# Patient Record
Sex: FEMALE | Race: WHITE | NOT HISPANIC OR LATINO | Employment: UNEMPLOYED | URBAN - METROPOLITAN AREA
[De-identification: names, ages, dates, MRNs, and addresses within clinical notes are randomized per-mention and may not be internally consistent; named-entity substitution may affect disease eponyms.]

---

## 2017-09-08 ENCOUNTER — TRANSCRIBE ORDERS (OUTPATIENT)
Dept: URGENT CARE | Facility: MEDICAL CENTER | Age: 27
End: 2017-09-08

## 2017-09-08 ENCOUNTER — APPOINTMENT (OUTPATIENT)
Dept: LAB | Facility: MEDICAL CENTER | Age: 27
End: 2017-09-08
Attending: FAMILY MEDICINE

## 2017-09-08 ENCOUNTER — APPOINTMENT (OUTPATIENT)
Dept: URGENT CARE | Facility: MEDICAL CENTER | Age: 27
End: 2017-09-08

## 2017-09-08 DIAGNOSIS — Z02.1 PHYSICAL EXAM, PRE-EMPLOYMENT: Primary | ICD-10-CM

## 2017-09-08 DIAGNOSIS — Z02.1 PHYSICAL EXAM, PRE-EMPLOYMENT: ICD-10-CM

## 2017-09-08 LAB — RUBV IGG SERPL IA-ACNC: 38.6 IU/ML

## 2017-09-08 PROCEDURE — 86762 RUBELLA ANTIBODY: CPT

## 2017-09-08 PROCEDURE — 86480 TB TEST CELL IMMUN MEASURE: CPT

## 2017-09-08 PROCEDURE — 86765 RUBEOLA ANTIBODY: CPT

## 2017-09-08 PROCEDURE — 86735 MUMPS ANTIBODY: CPT

## 2017-09-08 PROCEDURE — 86787 VARICELLA-ZOSTER ANTIBODY: CPT

## 2017-09-08 PROCEDURE — 36415 COLL VENOUS BLD VENIPUNCTURE: CPT

## 2017-09-10 LAB
ANNOTATION COMMENT IMP: NORMAL
GAMMA INTERFERON BACKGROUND BLD IA-ACNC: 0.08 IU/ML
M TB IFN-G BLD-IMP: NEGATIVE
M TB IFN-G CD4+ BCKGRND COR BLD-ACNC: 0 IU/ML
M TB IFN-G CD4+ T-CELLS BLD-ACNC: 0.08 IU/ML
MITOGEN IGNF BLD-ACNC: >10 IU/ML
QUANTIFERON-TB GOLD IN TUBE: NORMAL
SERVICE CMNT-IMP: NORMAL

## 2017-09-12 LAB
MEV IGG SER QL: NORMAL
MUV IGG SER QL: NORMAL
VZV IGG SER IA-ACNC: NORMAL

## 2017-10-12 ENCOUNTER — OFFICE VISIT (OUTPATIENT)
Dept: URGENT CARE | Facility: CLINIC | Age: 27
End: 2017-10-12
Payer: COMMERCIAL

## 2017-10-12 PROCEDURE — G0382 LEV 3 HOSP TYPE B ED VISIT: HCPCS

## 2017-10-13 NOTE — PROGRESS NOTES
Assessment  1  Muscle strain (848 9) (T14 8XXA)    Plan  Muscle strain    · Baclofen 10 MG Oral Tablet; TAKE 1 TABLET 3 TIMES DAILY AS NEEDED FOR  MUSCLE SPASM   · Naproxen 500 MG Oral Tablet; TAKE 1 TABLET EVERY 12 HOURS AS NEEDED    Discussion/Summary  Discussion Summary:   1) take medicine as prescribedapply ice and heat 10-20 minutes at a time at least 4 times a daygentle stretching and massage to area of pain  Medication Side Effects Reviewed: Possible side effects of new medications were reviewed with the patient/guardian today  Understands and agrees with treatment plan: The treatment plan was reviewed with the patient/guardian  The patient/guardian understands and agrees with the treatment plan   Counseling Documentation With Imm: The patient, patient's family was counseled regarding instructions for management,-patient and family education,-importance of compliance with treatment  Chief Complaint  1  Arm Pain  Chief Complaint Free Text Note Form: Patient relates was lifting weights on Sunday and started with pain to right upper arm on Monday  Hurts when she bends her arm  Denies fever  History of Present Illness  HPI: 30yo F p/w R upper arm pain x 4 days s/p lifting weights  Pt feels pain upon adduction and extension of arm  Pt has been taking ibuprofen occasionally and applying ice and heat once a day without relief  Pt denies weakness, numbness, redness, or swelling of arm  Hospital Based Practices Required Assessment:   Abuse And Domestic Violence Screen    Yes, the patient is safe at home -The patient states no one is hurting them  Depression And Suicide Screen  No, the patient has not had thoughts of hurting themself  No, the patient has not felt depressed in the past 7 days  Prefered Language is  english  Primary Language is  english  Review of Systems  Focused-Female:   Constitutional: No fever, no chills, feels well, no tiredness, no recent weight gain or loss     ENT: no ear ache, no loss of hearing, no nosebleeds or nasal discharge, no sore throat or hoarseness  Cardiovascular: no complaints of slow or fast heart rate, no chest pain, no palpitations, no leg claudication or lower extremity edema  Respiratory: no complaints of shortness of breath, no wheezing, no dyspnea on exertion, no orthopnea or PND  Breasts: no complaints of breast pain, breast lump or nipple discharge  Gastrointestinal: no complaints of abdominal pain, no constipation, no nausea or diarrhea, no vomiting, no bloody stools  Genitourinary: no complaints of dysuria, no incontinence, no pelvic pain, no dysmenorrhea, no vaginal discharge or abnormal vaginal bleeding  Musculoskeletal: arthralgias-and-myalgias, but-no joint swelling,-no limb pain,-no joint stiffness-and-no limb swelling  Integumentary: no complaints of skin rash or lesion, no itching or dry skin, no skin wounds  Neurological: no complaints of headache, no confusion, no numbness or tingling, no dizziness or fainting  ROS Reviewed:   ROS reviewed  Past Medical History  1  History of Denial (437 68) (X30 94)  Active Problems And Past Medical History Reviewed: The active problems and past medical history were reviewed and updated today  Family History  Mother    1  No pertinent family history  Father    2  No pertinent family history  Family History Reviewed: The family history was reviewed and updated today  Social History   · Never smoker  Social History Reviewed: The social history was reviewed and is unchanged  Surgical History  1  History of  Section   2  History of Tonsillectomy  Surgical History Reviewed: The surgical history was reviewed and updated today  Current Meds   1  No Reported Medications Recorded  Medication List Reviewed: The medication list was reviewed and updated today  Allergies  1   No Known Drug Allergies    Vitals  Signs   Recorded: [id number] 08:46AM Temperature: 99 F, Tympanic  Heart Rate: 74  Respiration: 18  Systolic: 644, LUE, Sitting  Diastolic: 74, LUE, Sitting  Height: 5 ft 3 75 in  Weight: 282 lb 6 oz  BMI Calculated: 48 85  BSA Calculated: 2 26  O2 Saturation: 98, RA  Pain Scale: 9    Physical Exam    Constitutional   General appearance: No acute distress, well appearing and well nourished  Pulmonary   Respiratory effort: No increased work of breathing or signs of respiratory distress  Auscultation of lungs: Clear to auscultation  Cardiovascular   Palpation of heart: Normal PMI, no thrills  Auscultation of heart: Normal rate and rhythm, normal S1 and S2, without murmurs  Examination of extremities for edema and/or varicosities: Normal     Musculoskeletal   Gait and station: Normal     Digits and nails: Normal without clubbing or cyanosis  Inspection/palpation of joints, bones, and muscles: Abnormal  -TTP over R triceps; pain upon adduction and extension of arm; ROM intact; no deformity, bruising, erythema, warmth to touch  Skin   Skin and subcutaneous tissue: Normal without rashes or lesions  Neurologic   Cranial nerves: Cranial nerves 2-12 intact  Reflexes: 2+ and symmetric  Sensation: No sensory loss  -pt vascularly and neurologically intact     Psychiatric   Orientation to person, place, and time: Normal     Mood and affect: Normal        Signatures   Electronically signed by : RON Marroquin; Oct 12 2017 10:23AM EST                       (Author)    Electronically signed by : CARMEN Knott ; Oct 12 2017  2:55PM EST                       (Co-author)

## 2018-01-10 ENCOUNTER — OFFICE VISIT (OUTPATIENT)
Dept: URGENT CARE | Facility: CLINIC | Age: 28
End: 2018-01-10
Payer: COMMERCIAL

## 2018-01-10 PROCEDURE — 94640 AIRWAY INHALATION TREATMENT: CPT

## 2018-01-10 PROCEDURE — G0382 LEV 3 HOSP TYPE B ED VISIT: HCPCS

## 2018-01-11 NOTE — PROGRESS NOTES
Assessment   1  Exercise-induced bronchospasm (493 81) (J45 990)    Plan   Cough    · Albuterol Sulfate (2 5 MG/3ML) 0 083% Inhalation Nebulization Solution  Exercise-induced bronchospasm    · Proventil  (90 Base) MCG/ACT Inhalation Aerosol Solution; INHALE 1-2    PUFFS EVERY 4-6 HOURS AS NEEDED AND AS DIRECTED    Discussion/Summary   Discussion Summary: You were given an albuterol nebulizer treatment in the office today  her albuterol inhaler as needed for symptoms  Suggest using 0 5 hour before physical activity  up with your family doctor if symptoms persist or worsen  Medication Side Effects Reviewed: Possible side effects of new medications were reviewed with the patient/guardian today  Understands and agrees with treatment plan: The treatment plan was reviewed with the patient/guardian  The patient/guardian understands and agrees with the treatment plan      Chief Complaint   1  Cough  Chief Complaint Free Text Note Form: Cough with Wheezing during activities patient had a episode last night during roller skating and felt like coulg not take a deep breath      History of Present Illness   HPI: Patient presents today with complaints of cough and wheezing that started last evening after doing laps with roller [place] practice  She states she did a history of bronchitis a few years ago  She states she has had wheezing with exercise in the past  She denies any associated fever chills, nausea or vomiting  Hospital Based Practices Required Assessment:      Abuse And Domestic Violence Screen       Yes, the patient is safe at home  -- The patient states no one is hurting them  Depression And Suicide Screen  No, the patient has not had thoughts of hurting themself  No, the patient has not felt depressed in the past 7 days  Prefered Language is  [place]  Primary Language is  English        Review of Systems   Focused-Female:      Constitutional: No fever, no chills, feels well, no tiredness, no recent weight gain or loss  ENT: no ear ache, no loss of hearing, no nosebleeds or nasal discharge, no sore throat or hoarseness  Cardiovascular: no complaints of slow or fast heart rate, no chest pain, no palpitations, no leg claudication or lower extremity edema  Respiratory: cough-- and-- wheezing  Musculoskeletal: no complaints of arthralgia, no myalgia, no joint swelling or stiffness, no limb pain or swelling  Integumentary: no rashes  Active Problems   1  Muscle strain (848 9) (T14 8XXA)    Past Medical History   1  History of Denial (799 29) (R45 89)  Active Problems And Past Medical History Reviewed: The active problems and past medical history were reviewed and updated today  Family History   Mother    1  No pertinent family history  Father    2  No pertinent family history  Family History Reviewed: The family history was reviewed and updated today  Social History    · Never smoker  Social History Reviewed: The social history was reviewed and updated today  Surgical History   1  History of  Section   2  History of Tonsillectomy  Surgical History Reviewed: The surgical history was reviewed and updated today  Current Meds    1  Baclofen 10 MG Oral Tablet; TAKE 1 TABLET 3 TIMES DAILY AS NEEDED FOR MUSCLE     SPASM; Therapy: [id number] to ( 30)  Requested for: 46MJC2976; Last     Rx:2017 Ordered   2  Naproxen 500 MG Oral Tablet; TAKE 1 TABLET EVERY 12 HOURS AS NEEDED; Therapy: [id number] to ( 30)  Requested for: 51WDT0075; Last     Rx:2017 Ordered  Medication List Reviewed: The medication list was reviewed and updated today  Allergies   1   No Known Drug Allergies    Vitals   Signs   Recorded: 95RNG7045 09:06AM   Temperature: 99 2 F  Heart Rate: 80  Systolic: 749  Diastolic: 79  Height: 5 ft 4 in  Weight: 279 lb   BMI Calculated: 47 89  BSA Calculated: 2 25  O2 Saturation: 97  Pain Scale: 2    Physical Exam        Constitutional      General appearance: No acute distress, well appearing and well nourished  Eyes      Conjunctiva and lids: No swelling, erythema or discharge  Pupils and irises: Equal, round and reactive to light  Ears, Nose, Mouth, and Throat      External inspection of ears and nose: Normal        Otoscopic examination: Tympanic membranes translucent with normal light reflex  Canals patent without erythema  Nasal mucosa, septum, and turbinates: Normal without edema or erythema  Oropharynx: Normal with no erythema, edema, exudate or lesions  Pulmonary      Respiratory effort: No increased work of breathing or signs of respiratory distress  Auscultation of lungs: Abnormal  -- Decreased breath sounds with end expiratory wheeze  No costal retractions  Procedure        Treatment #1 included Albuterol Sulfate 2 5 mg  Air exchange was improved        Signatures    Electronically signed by : Christie Barker DO; Roger 10 2018  9:39AM EST                       (Author)

## 2018-01-23 VITALS
BODY MASS INDEX: 47.63 KG/M2 | HEART RATE: 80 BPM | WEIGHT: 279 LBS | SYSTOLIC BLOOD PRESSURE: 119 MMHG | OXYGEN SATURATION: 97 % | TEMPERATURE: 99.2 F | DIASTOLIC BLOOD PRESSURE: 79 MMHG | HEIGHT: 64 IN

## 2018-03-01 ENCOUNTER — EVALUATION (OUTPATIENT)
Dept: PHYSICAL THERAPY | Facility: CLINIC | Age: 28
End: 2018-03-01
Payer: COMMERCIAL

## 2018-03-01 DIAGNOSIS — M25.472 LEFT ANKLE SWELLING: ICD-10-CM

## 2018-03-01 DIAGNOSIS — M25.572 ACUTE LEFT ANKLE PAIN: Primary | ICD-10-CM

## 2018-03-01 PROCEDURE — 97162 PT EVAL MOD COMPLEX 30 MIN: CPT | Performed by: PHYSICAL THERAPIST

## 2018-03-01 PROCEDURE — G8978 MOBILITY CURRENT STATUS: HCPCS | Performed by: PHYSICAL THERAPIST

## 2018-03-01 PROCEDURE — G8979 MOBILITY GOAL STATUS: HCPCS | Performed by: PHYSICAL THERAPIST

## 2018-03-01 PROCEDURE — 97014 ELECTRIC STIMULATION THERAPY: CPT | Performed by: PHYSICAL THERAPIST

## 2018-03-01 PROCEDURE — 97110 THERAPEUTIC EXERCISES: CPT | Performed by: PHYSICAL THERAPIST

## 2018-03-01 RX ORDER — ASPIRIN 325 MG
325 TABLET ORAL DAILY
COMMUNITY

## 2018-03-01 NOTE — PROGRESS NOTES
PT Evaluation     Today's date: 3/1/2018  Patient name: Jules Alvarez  : 1990  MRN: [id number]  Referring provider: Nick León MD  Dx:   Encounter Diagnosis     ICD-10-CM    1  Acute left ankle pain M25 572    2  Left ankle swelling M25 472                   Assessment  Impairments: abnormal gait, abnormal muscle firing, abnormal muscle tone, abnormal or restricted ROM, impaired balance, impaired physical strength, lacks appropriate home exercise program and pain with function  Other impairment: Swelling  Functional limitations: Difficulty walking, Difficulty with stairs  Assessment details: Patient is a 33 y o  female presenting to PT on 3/1/2018 for Acute left ankle pain  (primary encounter diagnosis) andLeft ankle swelling secondary to bimalleolar fracture sustained on 18 with ORIF on 2/15  Maria Isabel Rodriguez is demonstrating objective deficits with strength, ROM, and mobility associated with diagnosis and is experiencing significant subsequent functional deficits  Pt  Would benefit from skilled physical therapy to address impairments listed above and promote maximum level of function       Barriers to therapy: Transportatin, high copay  Understanding of Dx/Px/POC: good   Prognosis: good    Goals    Short Term Goals:  - Decrease pain by 50% in 3 weeks  - Increase ROM by 50% in 4 weeks  - Increase strength by 50% in 4 weeks    Long Term Goals:  - Independent with comprehensive home exercise program at discharge  - Increase Functional Status Measure to: 70 (FOTO)  - Increase strength equal to contralateral side at discharge  - Increase ROM to [facility] at discharge      Plan  Planned modality interventions: cryotherapy and unattended electrical stimulation  Planned therapy interventions: abdominal trunk stabilization, balance, balance/weight bearing training, body mechanics training, home exercise program, flexibility, functional ROM exercises, therapeutic exercise, therapeutic activities, stretching, strengthening, manual therapy, joint mobilization, neuromuscular re-education and postural training  Frequency: 2x week  Duration in weeks: 8  Treatment plan discussed with: patient        Subjective Evaluation    History of Present Illness  Date of onset: 2018  Date of surgery: 2/15/2018  Mechanism of injury: Pt  Presents to PT with cc of L ankle pain and swelling secondary to sustaining closed bimalleolar fracture on 18 while playing roller derby  She had fracture reduced at bedside and was given a splint  She received ORIF on 2/15/18 and was d/c home that day  She was in cast for two weeks and transitioned to removable boot  She presents with script for PT with NWB precautions  Pt  Denies any fever/chills or ss of infection  She does note tightness in calf and foot mm  She is f/u with surgeon next week  Pain  Current pain ratin  At best pain ratin  At worst pain ratin  Location: L ankle  Quality: pressure, sharp, burning and discomfort  Relieving factors: relaxation, rest, support and ice  Aggravating factors: standing    Social Support  Steps to enter house: yes  Stairs in house: yes   Lives with: young children and spouse    Employment status: not working  Patient Goals  Patient goals for therapy: increased motion, decreased edema, decreased pain, increased strength, return to sport/leisure activities, return to work and independence with ADLs/IADLs          Objective     Observations   Left Ankle/Foot   Positive for edema  Additional Observation Details  Medial and lateral incisions intact with no drainage or ss of infection  Palpation   Left   Tenderness of the anterior tibialis, lateral gastrocnemius and medial gastrocnemius       Neurological Testing     Sensation     Ankle/Foot   Left Ankle/Foot   Intact: light touch    Right Ankle/Foot   Intact: light touch     Reflexes   Left   Patellar (L4): normal (2+)    Right   Patellar (L4): normal (2+)    Active Range of Motion   Left Ankle/Foot   Dorsiflexion (ke): -9 degrees   Plantar flexion: 29 degrees   Inversion: 2 degrees   Eversion: 2 degrees     Right Ankle/Foot   Dorsiflexion (ke): 15 degrees   Plantar flexion: WFL  Inversion: WFL  Eversion: WFL    Passive Range of Motion   Left Ankle/Foot    Dorsiflexion (ke): -3 degrees   Plantar flexion: 40 degrees   Inversion: 4 degrees   Eversion: 6 degrees     Strength/Myotome Testing     Right Ankle/Foot   Normal strength    Swelling   Left Ankle/Foot   Metatarsal heads: 29 9 cm  Figure 8: 63 8 cm    Right Ankle/Foot   Metatarsal heads: 26 cm  Figure 8: 57 cm    Ambulation   Weight-Bearing Status   Weight-Bearing Status (Left): non-weight-bearing   Assistive device used: crutches          Precautions: s/p L ankle ORIF 2/15/18, NWB (6 weeks)    Daily Treatment Diary     Manual  3/1            Retrograde Massage -                                                                    Exercise Diary  3/1            Ankle Pumps 30x            Toe Crunches 30x            ABCs 2x            Inv/Ev 30                                                                                                                                                                                                                                Modalities  3/1            E-stim with CP 15 min

## 2018-03-01 NOTE — LETTER
2018    Emy Benson MD  [address] [facility] [id number]  [id number]    Patient: Ricardo Banda   YOB: 1990   Date of Visit: 3/1/2018     Encounter Diagnosis     ICD-10-CM    1  Acute left ankle pain M25 572    2  Left ankle swelling M25 472        Dear Dr Bertha Dunlap:    Please review the attached Plan of Care from [facility] recent visit  Please verify that you agree therapy should continue by signing the attached document and sending it back to our office  If you have any questions or concerns, please don't hesitate to call  Sincerely,    Saleem Sanabria, PT      Referring Provider:      I certify that I have read the below Plan of Care and certify the need for these services furnished under this plan of treatment while under my care  Emy Benson MD  [address] [facility] [id number] [facility]  VIA Facsimile: [phone]          PT Evaluation     Today's date: 3/1/2018  Patient name: Ricardo Banda  : 1990  MRN: [id number]  Referring provider: Nathan Hoffmann MD  Dx:   Encounter Diagnosis     ICD-10-CM    1  Acute left ankle pain M25 572    2  Left ankle swelling M25 472                   Assessment  Impairments: abnormal gait, abnormal muscle firing, abnormal muscle tone, abnormal or restricted ROM, impaired balance, impaired physical strength, lacks appropriate home exercise program and pain with function  Other impairment: Swelling  Functional limitations: Difficulty walking, Difficulty with stairs  Assessment details: Patient is a 33 y o  female presenting to PT on 3/1/2018 for Acute left ankle pain  (primary encounter diagnosis) andLeft ankle swelling secondary to bimalleolar fracture sustained on 18 with ORIF on 2/15  Peg Cellar is demonstrating objective deficits with strength, ROM, and mobility associated with diagnosis and is experiencing significant subsequent functional deficits   Pt  Would benefit from skilled physical therapy to address impairments listed above and promote maximum level of function  Barriers to therapy: Transportatin, high copay  Understanding of Dx/Px/POC: good   Prognosis: good    Goals    Short Term Goals:  - Decrease pain by 50% in 3 weeks  - Increase ROM by 50% in 4 weeks  - Increase strength by 50% in 4 weeks    Long Term Goals:  - Independent with comprehensive home exercise program at discharge  - Increase Functional Status Measure to: 70 (FOTO)  - Increase strength equal to contralateral side at discharge  - Increase ROM to [facility] at discharge      Plan  Planned modality interventions: cryotherapy and unattended electrical stimulation  Planned therapy interventions: abdominal trunk stabilization, balance, balance/weight bearing training, body mechanics training, home exercise program, flexibility, functional ROM exercises, therapeutic exercise, therapeutic activities, stretching, strengthening, manual therapy, joint mobilization, neuromuscular re-education and postural training  Frequency: 2x week  Duration in weeks: 8  Treatment plan discussed with: patient        Subjective Evaluation    History of Present Illness  Date of onset: 2018  Date of surgery: 2/15/2018  Mechanism of injury: Pt  Presents to PT with cc of L ankle pain and swelling secondary to sustaining closed bimalleolar fracture on 18 while playing roller derby  She had fracture reduced at bedside and was given a splint  She received ORIF on 2/15/18 and was d/c home that day  She was in cast for two weeks and transitioned to removable boot  She presents with script for PT with NWB precautions  Pt  Denies any fever/chills or ss of infection  She does note tightness in calf and foot mm  She is f/u with surgeon next week     Pain  Current pain ratin  At best pain ratin  At worst pain ratin  Location: L ankle  Quality: pressure, sharp, burning and discomfort  Relieving factors: relaxation, rest, support and ice  Aggravating factors: standing    Social Support  Steps to enter house: yes  Stairs in house: yes   Lives with: young children and spouse    Employment status: not working  Patient Goals  Patient goals for therapy: increased motion, decreased edema, decreased pain, increased strength, return to sport/leisure activities, return to work and independence with ADLs/IADLs          Objective     Observations   Left Ankle/Foot   Positive for edema  Additional Observation Details  Medial and lateral incisions intact with no drainage or ss of infection  Palpation   Left   Tenderness of the anterior tibialis, lateral gastrocnemius and medial gastrocnemius       Neurological Testing     Sensation     Ankle/Foot   Left Ankle/Foot   Intact: light touch    Right Ankle/Foot   Intact: light touch     Reflexes   Left   Patellar (L4): normal (2+)    Right   Patellar (L4): normal (2+)    Active Range of Motion   Left Ankle/Foot   Dorsiflexion (ke): -9 degrees   Plantar flexion: 29 degrees   Inversion: 2 degrees   Eversion: 2 degrees     Right Ankle/Foot   Dorsiflexion (ke): 15 degrees   Plantar flexion: WFL  Inversion: WFL  Eversion: WFL    Passive Range of Motion   Left Ankle/Foot    Dorsiflexion (ke): -3 degrees   Plantar flexion: 40 degrees   Inversion: 4 degrees   Eversion: 6 degrees     Strength/Myotome Testing     Right Ankle/Foot   Normal strength    Swelling   Left Ankle/Foot   Metatarsal heads: 29 9 cm  Figure 8: 63 8 cm    Right Ankle/Foot   Metatarsal heads: 26 cm  Figure 8: 57 cm    Ambulation   Weight-Bearing Status   Weight-Bearing Status (Left): non-weight-bearing   Assistive device used: crutches          Precautions: s/p L ankle ORIF 2/15/18, NWB (6 weeks)    Daily Treatment Diary     Manual  3/1            Retrograde Massage -                                                                    Exercise Diary  3/1            Ankle Pumps 30x            Toe Crunches 30x            ABCs 2x            Inv/Ev 30 Modalities  3/1            E-stim with CP 15 min

## 2018-03-02 ENCOUNTER — TRANSCRIBE ORDERS (OUTPATIENT)
Dept: PHYSICAL THERAPY | Facility: CLINIC | Age: 28
End: 2018-03-02

## 2018-03-02 DIAGNOSIS — M25.472 LEFT ANKLE SWELLING: ICD-10-CM

## 2018-03-02 DIAGNOSIS — M25.572 LEFT ANKLE PAIN, UNSPECIFIED CHRONICITY: Primary | ICD-10-CM

## 2018-03-06 ENCOUNTER — OFFICE VISIT (OUTPATIENT)
Dept: PHYSICAL THERAPY | Facility: CLINIC | Age: 28
End: 2018-03-06
Payer: COMMERCIAL

## 2018-03-06 DIAGNOSIS — M25.572 ACUTE LEFT ANKLE PAIN: Primary | ICD-10-CM

## 2018-03-06 DIAGNOSIS — M25.472 LEFT ANKLE SWELLING: ICD-10-CM

## 2018-03-06 PROCEDURE — 97140 MANUAL THERAPY 1/> REGIONS: CPT | Performed by: PHYSICAL THERAPIST

## 2018-03-06 PROCEDURE — 97110 THERAPEUTIC EXERCISES: CPT | Performed by: PHYSICAL THERAPIST

## 2018-03-06 PROCEDURE — 97014 ELECTRIC STIMULATION THERAPY: CPT | Performed by: PHYSICAL THERAPIST

## 2018-03-06 NOTE — PROGRESS NOTES
Daily Note     Today's date: 3/6/2018  Patient name: Zack Ponce  : 1990  MRN: [id number]  Referring provider: Theo Quevedo MD  Dx:   Encounter Diagnosis     ICD-10-CM    1  Acute left ankle pain M25 572    2  Left ankle swelling M25 472                   Subjective: Pt  Notes some soreness following last session but it only lasted a few minutes  She notes catching foot on curb and fell on buttocks in controlled fashion  She notes being startled but denies any trauma/pain  Pt  Seeing MD today for staple removal/f/u  Objective: See treatment diary below    Precautions: s/p L ankle ORIF 2/15/18, NWB (6 weeks)     Daily Treatment Diary      Manual  3/1  3/6                   Retrograde Massage -  5 min                   Calf STM/MFR -  5 min                                                                                                 Exercise Diary  3/1  3/6                   Ankle Pumps 30x  30x                   Toe Crunches 30x  30x                   ABCs 2x  2x                   Inv/Ev 30  30x                   SLR - 30x                   S/L Abduction - 20x                   LAQ - 30x                                                                                                                                                                                                                                                                                                                                                 Modalities  3/1  3/6                   E-stim with CP 15 min  15 min                                                                        Assessment: Pt  Tolerating treatment well  She was educated on importance of AROM and proximal strengthening to promote healing and reduce fatty infiltrate  Pt  Does not show any symptoms/issues following reporting tripping on curb  Plan: Continue per plan of care  Progress treament per protocol

## 2018-03-08 ENCOUNTER — OFFICE VISIT (OUTPATIENT)
Dept: PHYSICAL THERAPY | Facility: CLINIC | Age: 28
End: 2018-03-08
Payer: COMMERCIAL

## 2018-03-08 DIAGNOSIS — M25.472 LEFT ANKLE SWELLING: ICD-10-CM

## 2018-03-08 DIAGNOSIS — M25.572 ACUTE LEFT ANKLE PAIN: Primary | ICD-10-CM

## 2018-03-08 PROCEDURE — 97140 MANUAL THERAPY 1/> REGIONS: CPT | Performed by: PHYSICAL THERAPIST

## 2018-03-08 PROCEDURE — 97110 THERAPEUTIC EXERCISES: CPT | Performed by: PHYSICAL THERAPIST

## 2018-03-08 PROCEDURE — 97014 ELECTRIC STIMULATION THERAPY: CPT | Performed by: PHYSICAL THERAPIST

## 2018-03-12 ENCOUNTER — OFFICE VISIT (OUTPATIENT)
Dept: PHYSICAL THERAPY | Facility: CLINIC | Age: 28
End: 2018-03-12
Payer: COMMERCIAL

## 2018-03-12 DIAGNOSIS — M25.472 LEFT ANKLE SWELLING: ICD-10-CM

## 2018-03-12 DIAGNOSIS — M25.572 ACUTE LEFT ANKLE PAIN: Primary | ICD-10-CM

## 2018-03-12 PROCEDURE — 97140 MANUAL THERAPY 1/> REGIONS: CPT | Performed by: PHYSICAL THERAPIST

## 2018-03-12 PROCEDURE — 97110 THERAPEUTIC EXERCISES: CPT | Performed by: PHYSICAL THERAPIST

## 2018-03-12 PROCEDURE — 97014 ELECTRIC STIMULATION THERAPY: CPT | Performed by: PHYSICAL THERAPIST

## 2018-03-12 NOTE — PROGRESS NOTES
Daily Note     Today's date: 3/12/2018  Patient name: Celso Angela  : 1990  MRN: [id number]  Referring provider: Julien Navarro MD  Dx:   Encounter Diagnosis     ICD-10-CM    1  Acute left ankle pain M25 572    2  Left ankle swelling M25 472                   Subjective: Pt  States use of crutches is getting easier  She is planning to do work part time starting next week  Objective: See treatment diary below    Precautions: s/p L ankle ORIF 2/15/18, NWB (6 weeks)     Daily Treatment Diary      Manual  3/1  3/6  3/8  3/12               Retrograde Massage -  5 min  5 min  -               Calf STM/MFR -  5 min  5 min  IASTM 10 min                                                                                             Exercise Diary  3/1  3/6  3/8  3/12               Ankle Pumps 30x  30x  30x  30x               Toe Crunches 30x  30x  30x  30x               ABCs 2x  2x  2x  2x               Inv/Ev 30  30x  30x  30x               SLR - 30x  30x  30x               S/L Abduction - 20x  30x  30x               LAQ - 30x  30x  30x               Calf Stretch - - 4x30"  4x30"               Reverse Crunch - - -  10x                                                                                                                                                                                                                                                                                             Modalities  3/1  3/6  3/8  3/12               E-stim with CP 15 min  15 min  15 min  15 min                                                                    Assessment: Celso Angela was progressed with PT interventions, focusing on appropriate technique and intensity  Pt  Required mod cuing from therapist to complete  Pt  Would benefit from continued physical therapy to promote improved activity tolerance and function  Plan: Continue per plan of care

## 2018-03-15 ENCOUNTER — OFFICE VISIT (OUTPATIENT)
Dept: PHYSICAL THERAPY | Facility: CLINIC | Age: 28
End: 2018-03-15
Payer: COMMERCIAL

## 2018-03-15 DIAGNOSIS — M25.472 LEFT ANKLE SWELLING: ICD-10-CM

## 2018-03-15 DIAGNOSIS — M25.572 ACUTE LEFT ANKLE PAIN: Primary | ICD-10-CM

## 2018-03-15 PROCEDURE — 97110 THERAPEUTIC EXERCISES: CPT | Performed by: PHYSICAL THERAPIST

## 2018-03-15 PROCEDURE — 97014 ELECTRIC STIMULATION THERAPY: CPT | Performed by: PHYSICAL THERAPIST

## 2018-03-15 PROCEDURE — 97140 MANUAL THERAPY 1/> REGIONS: CPT | Performed by: PHYSICAL THERAPIST

## 2018-03-15 NOTE — PROGRESS NOTES
Daily Note     Today's date: 3/15/2018  Patient name: Natalie Borjas  : 1990  MRN: [id number]  Referring provider: Matthew Casiano MD  Dx:   Encounter Diagnosis     ICD-10-CM    1  Acute left ankle pain M25 572    2  Left ankle swelling M25 472                   Subjective: Pt  Notes knee seems to be loosening up lately  She continues to have ankle soreness and stiffness  Objective: See treatment diary below  Precautions: s/p L ankle ORIF 2/15/18, NWB (6 weeks)     Daily Treatment Diary      Manual  3/1  3/6  3/8  3/12  3/15             Retrograde Massage -  5 min  5 min  -               Calf STM/MFR -  5 min  5 min  IASTM 10 min  IASTM10 min                                                                                           Exercise Diary  3/1  3/6  3/8  3/12  3/15             Ankle Pumps 30x  30x  30x  30x  30x             Toe Crunches 30x  30x  30x  30x  30x             ABCs 2x  2x  2x  2x  2x             Inv/Ev 30  30x  30x  30x  30x             SLR - 30x  30x  30x  30x             S/L Abduction - 20x  30x  30x  30x             LAQ - 30x  30x  30x  30x             Calf Stretch - - 4x30"  4x30"  4x30"             Reverse Crunch - - -  10x  20x              S/L abduction - - - - 30x              hip Extensions - - - - 30x                                                                                                                                                                                                                                           Modalities  3/1  3/6  3/8  3/12  3/15             E-stim with CP 15 min  15 min  15 min  15 min  15 min                                                                    Assessment: Tolerated treatment well  Patient demonstrated fatigue post treatment, exhibited good technique with therapeutic exercises and would benefit from continued PT      Plan: Continue per plan of care  Progress treatment as tolerated

## 2018-03-19 ENCOUNTER — OFFICE VISIT (OUTPATIENT)
Dept: PHYSICAL THERAPY | Facility: CLINIC | Age: 28
End: 2018-03-19
Payer: COMMERCIAL

## 2018-03-19 DIAGNOSIS — M25.472 LEFT ANKLE SWELLING: ICD-10-CM

## 2018-03-19 DIAGNOSIS — M25.572 ACUTE LEFT ANKLE PAIN: Primary | ICD-10-CM

## 2018-03-19 PROCEDURE — 97110 THERAPEUTIC EXERCISES: CPT

## 2018-03-19 PROCEDURE — 97014 ELECTRIC STIMULATION THERAPY: CPT

## 2018-03-19 PROCEDURE — 97140 MANUAL THERAPY 1/> REGIONS: CPT

## 2018-03-19 NOTE — PROGRESS NOTES
Daily Note     Today's date: 3/19/2018  Patient name: Danial Dunn  : 1990  MRN: [id number]  Referring provider: Ene Ron MD  Dx:   Encounter Diagnosis     ICD-10-CM    1  Acute left ankle pain M25 572    2  Left ankle swelling M25 472                   Subjective: Patient reports her ankle is feeling really good itself  Stated she is tired of using crutches  Objective: See treatment diary below      Assessment: Progressed through TE w/ no complaints  Very sensitive @ medial calf as noted during IASTM  Tolerated treatment well  Patient exhibited good technique with therapeutic exercises and would benefit from continued PT      Plan: Continue per plan of care  Cont  To progress per protocol      Precautions: s/p L ankle ORIF 2/15/18, NWB (6 weeks)     Daily Treatment Diary      Manual  3/1  3/6  3/8  3/12  3/15  3/19           Retrograde Massage -  5 min  5 min  -               Calf STM/MFR -  5 min  5 min  IASTM 10 min  IASTM10 min  ISTM 10min                                                                                         Exercise Diary  3/1  3/6  3/8  3/12  3/15  3/19           Ankle Pumps 30x  30x  30x  30x  30x  30x           Toe Crunches 30x  30x  30x  30x  30x 30x           ABCs 2x  2x  2x  2x  2x  2x           Inv/Ev 30  30x  30x  30x  30x  30x           SLR - 30x  30x  30x  30x  30x           S/L Abduction - 20x  30x  30x  30x  30x           LAQ - 30x  30x  30x  30x  30x           Calf Stretch - - 4x30"  4x30"  4x30"  4x30"           Reverse Crunch - - -  10x  20x              S/L abduction - - - - 30x  30x            hip Extensions - - - - 30x  30x                                                                                                                                                                                                                                         Modalities  3/1  3/6  3/8  3/12  3/15  3/19           E-stim with CP 15 min  15 min  15 min  15 min  15 min  15 min

## 2018-03-22 ENCOUNTER — OFFICE VISIT (OUTPATIENT)
Dept: PHYSICAL THERAPY | Facility: CLINIC | Age: 28
End: 2018-03-22
Payer: COMMERCIAL

## 2018-03-22 DIAGNOSIS — M25.572 ACUTE LEFT ANKLE PAIN: Primary | ICD-10-CM

## 2018-03-22 DIAGNOSIS — M25.472 LEFT ANKLE SWELLING: ICD-10-CM

## 2018-03-22 PROCEDURE — 97140 MANUAL THERAPY 1/> REGIONS: CPT | Performed by: PHYSICAL THERAPIST

## 2018-03-22 PROCEDURE — 97014 ELECTRIC STIMULATION THERAPY: CPT | Performed by: PHYSICAL THERAPIST

## 2018-03-22 PROCEDURE — 97110 THERAPEUTIC EXERCISES: CPT | Performed by: PHYSICAL THERAPIST

## 2018-03-22 NOTE — PROGRESS NOTES
Daily Note     Today's date: 3/22/2018  Patient name: Darrius Theodore  : 1990  MRN: [id number]  Referring provider: Alecia Barton MD  Dx:   Encounter Diagnosis     ICD-10-CM    1  Acute left ankle pain M25 572    2  Left ankle swelling M25 472                   Subjective: Pt  Remains frustrated with crutches  States she is seeing MD tomorrow due to fear of infection  She denies fever/chills  Objective: See treatment diary below      Assessment: Pt  ttp is reducing  (-) Kuldeep's at calf, no discoloration  Pt  Has palpable trigger points remaining  Pt  Is seeing MD tomorrow due to fear of infection at incision  Pt  Does not show any erythema or discharge but she reports itching yesterday and some bloody discharge  Pt  Educated on ways of reducing pressure against incision in walking boot to reduce agitation  Plan: Continue per plan of care  Cont  To progress per protocol      Precautions: s/p L ankle ORIF 2/15/18, NWB (6 weeks)     Daily Treatment Diary      Manual  3/1  3/6  3/8  3/12  3/15  3/19  3/22         Retrograde Massage -  5 min  5 min  -               Calf STM/MFR -  5 min  5 min  IASTM 10 min  IASTM10 min  ISTM 10min  IASTM 10 min                                                                                       Exercise Diary  3/1  3/6  3/8  3/12  3/15  3/19  3/22         Ankle Pumps 30x  30x  30x  30x  30x  30x  30x         Toe Crunches 30x  30x  30x  30x  30x 30x  30x         ABCs 2x  2x  2x  2x  2x  2x  2x         Inv/Ev 30  30x  30x  30x  30x  30x  30x         SLR - 30x  30x  30x  30x  30x  30x         S/L Abduction - 20x  30x  30x  30x  30x  30x         LAQ - 30x  30x  30x  30x  30x  30x         Calf Stretch - - 4x30"  4x30"  4x30"  4x30"  4x30"         Reverse Crunch - - -  10x  20x    30x          S/L abduction - - - - 30x  30x  30x          hip Extensions - - - - 30x  30x  30x          HS Curls - - - - - -  30x          nerve Glides - - - - - -  10x                                                                                                                                                                                       Modalities  3/1  3/6  3/8  3/12  3/15  3/19  3/22         E-stim with CP 15 min  15 min  15 min  15 min  15 min  15 min  15 min

## 2018-03-26 ENCOUNTER — APPOINTMENT (OUTPATIENT)
Dept: PHYSICAL THERAPY | Facility: CLINIC | Age: 28
End: 2018-03-26
Payer: COMMERCIAL

## 2018-03-29 ENCOUNTER — OFFICE VISIT (OUTPATIENT)
Dept: PHYSICAL THERAPY | Facility: CLINIC | Age: 28
End: 2018-03-29
Payer: COMMERCIAL

## 2018-03-29 DIAGNOSIS — M25.572 ACUTE LEFT ANKLE PAIN: Primary | ICD-10-CM

## 2018-03-29 DIAGNOSIS — M25.472 LEFT ANKLE SWELLING: ICD-10-CM

## 2018-03-29 PROCEDURE — 97014 ELECTRIC STIMULATION THERAPY: CPT | Performed by: PHYSICAL THERAPIST

## 2018-03-29 PROCEDURE — 97110 THERAPEUTIC EXERCISES: CPT | Performed by: PHYSICAL THERAPIST

## 2018-03-29 PROCEDURE — 97140 MANUAL THERAPY 1/> REGIONS: CPT | Performed by: PHYSICAL THERAPIST

## 2018-03-29 NOTE — PROGRESS NOTES
Daily Note     Today's date: 3/29/2018  Patient name: Desire Meredith  : 1990  MRN: [id number]  Referring provider: Aruna Keita MD  Dx:   Encounter Diagnosis     ICD-10-CM    1  Acute left ankle pain M25 572    2  Left ankle swelling M25 472                   Subjective: Pt  Visited surgeon and has been cleared for PWB through ankle  He has her starting new round of antibiotics but feels infection is clearing up  Pt  Notes compliance to HEP  Objective: See treatment diary below  Precautions: S/P L Ankle ORIF     Daily Treatment Diary     Manual  3/29            Gr 1-2 Ankle Mobz 5 min            PROM into restrictions 5 min            MRE in [place] -            IASTM -                             Exercise Diary  3/29            Stationary Bike L1, 5 min            Gait Training  Level Surf  TB 4 way [place], 20x            Seated HR 20x            Baps 3 min            LAQ 30x            Leg Curl 30x            SLR 4 way 30x                                                                                                                                                                            Modalities  3/29            HV with CP 15 min                                                Assessment: Pt  Began PWB ambulation with single crutch  She tolerated well but is limited by apprehension  Pt  Progressed per protocol as she is 6 weeks s/p ORIF  Pt  Would benefit from continued PT to maximize function and increase activity tolerance  Plan: Continue per plan of care  Progress treament per protocol

## 2018-04-02 ENCOUNTER — OFFICE VISIT (OUTPATIENT)
Dept: PHYSICAL THERAPY | Facility: CLINIC | Age: 28
End: 2018-04-02
Payer: COMMERCIAL

## 2018-04-02 DIAGNOSIS — M25.572 ACUTE LEFT ANKLE PAIN: Primary | ICD-10-CM

## 2018-04-02 DIAGNOSIS — M25.472 LEFT ANKLE SWELLING: ICD-10-CM

## 2018-04-02 PROCEDURE — 97014 ELECTRIC STIMULATION THERAPY: CPT

## 2018-04-02 PROCEDURE — 97110 THERAPEUTIC EXERCISES: CPT

## 2018-04-02 PROCEDURE — 97112 NEUROMUSCULAR REEDUCATION: CPT

## 2018-04-05 ENCOUNTER — OFFICE VISIT (OUTPATIENT)
Dept: PHYSICAL THERAPY | Facility: CLINIC | Age: 28
End: 2018-04-05
Payer: COMMERCIAL

## 2018-04-05 DIAGNOSIS — M25.572 ACUTE LEFT ANKLE PAIN: Primary | ICD-10-CM

## 2018-04-05 DIAGNOSIS — M25.472 LEFT ANKLE SWELLING: ICD-10-CM

## 2018-04-05 PROCEDURE — 97112 NEUROMUSCULAR REEDUCATION: CPT | Performed by: PHYSICAL THERAPIST

## 2018-04-05 PROCEDURE — 97110 THERAPEUTIC EXERCISES: CPT | Performed by: PHYSICAL THERAPIST

## 2018-04-05 PROCEDURE — 97014 ELECTRIC STIMULATION THERAPY: CPT | Performed by: PHYSICAL THERAPIST

## 2018-04-05 NOTE — PROGRESS NOTES
Daily Note     Today's date: 2018  Patient name: Jorge Luis Cornelius  : 1990  MRN: [id number]  Referring provider: Danae Thacker MD  Dx:   Encounter Diagnosis     ICD-10-CM    1  Acute left ankle pain M25 572    2  Left ankle swelling M25 472                   Subjective: Pt  Denies soreness following last session  Objective: See treatment diary below      Assessment: Pt  Demonstrating good technique with stair ambulation today  She was trained with using shoe and brace for PWB  Pt  Progressed with OKC and CKC strengthening per protocol  PROM restricted in all motions  Plan: Continue per plan of care       Precautions: S/P L Ankle ORIF      Daily Treatment Diary      Manual  3/29  4/2  4/5                 Gr 1-2 Ankle Mobz 5 min  np  5 min                 PROM into restrictions 5 min  5 min  5 min                 MRE in [place] -    -                 IASTM -    5 min                                               Exercise Diary  3/29  4/2  4/5                 Stationary Bike L1, 5 min  10 min  10 min                 Gait Training  Level Surf   weight shift/ stair training  with shoe                 TB 4 way [place], 20x  [place] x30 [place], 30x                 Seated HR 20x  20x  30x                 Baps 3 min  30x  ea  30x ea                 LAQ 30x  #1 5 x30  3#, 30x                 Leg Curl 30x  #1 5 x30  3#, 30x                 SLR 4 way 30x  #1 5 x30  3#, 30x                                                                                                                                                                                                                                                                                                                       Modalities  3/29  4/2  4/5                 HV with CP 15 min  15 min  15 min

## 2018-04-09 ENCOUNTER — OFFICE VISIT (OUTPATIENT)
Dept: PHYSICAL THERAPY | Facility: CLINIC | Age: 28
End: 2018-04-09
Payer: COMMERCIAL

## 2018-04-09 ENCOUNTER — APPOINTMENT (OUTPATIENT)
Dept: PHYSICAL THERAPY | Facility: CLINIC | Age: 28
End: 2018-04-09
Payer: COMMERCIAL

## 2018-04-09 DIAGNOSIS — M25.572 ACUTE LEFT ANKLE PAIN: Primary | ICD-10-CM

## 2018-04-09 DIAGNOSIS — M25.472 LEFT ANKLE SWELLING: ICD-10-CM

## 2018-04-09 PROCEDURE — 97110 THERAPEUTIC EXERCISES: CPT | Performed by: PHYSICAL THERAPIST

## 2018-04-09 PROCEDURE — 97014 ELECTRIC STIMULATION THERAPY: CPT | Performed by: PHYSICAL THERAPIST

## 2018-04-09 PROCEDURE — 97112 NEUROMUSCULAR REEDUCATION: CPT | Performed by: PHYSICAL THERAPIST

## 2018-04-09 NOTE — PROGRESS NOTES
Daily Note     Today's date: 2018  Patient name: Svetlana Rocha  : 1990  MRN: [id number]  Referring provider: Esa Powell MD  Dx:   Encounter Diagnosis     ICD-10-CM    1  Acute left ankle pain M25 572    2  Left ankle swelling M25 472                   Subjective: Pt  Reports pain is reducing but experienced redness and soreness at incision and made appt with surgeon who also rx antibiotics for 3rd regimen          Objective: See treatment diary below      Assessment: Svetlana Rocha was progressed with PT interventions, focusing on appropriate technique and intensity  Pt  Required frequent cuing from therapist to complete  Pt  Would benefit from continued physical therapy to promote improved activity tolerance and function  Pt incision shows no discharge but has erythema on border  Pt  Denies fever/chills  Plan: Continue per plan of care       Precautions: S/P L Ankle ORIF      Daily Treatment Diary      Manual  3/29  4/2  4/5  4/9               Gr 1-2 Ankle Mobz 5 min  np  5 min  5 min               PROM into restrictions 5 min  5 min  5 min  5 min               MRE in [place]C -    -                 IASTM -    5 min  3 min                                             Exercise Diary  3/29  4/2  4/5  4/9               Stationary Bike L1, 5 min  10 min  10 min  10 min               Gait Training  Level Surf   weight shift/ stair training  with shoe  with shoe               TB 4 way [place], 20x  [place] x30 [place], 30x  o, 30x               Seated HR 20x  20x  30x  30x               Baps 3 min  30x  ea  30x ea  30x ea               LAQ 30x  #1 5 x30  3#, 30x  5#, 30x               Leg Curl 30x  #1 5 x30  3#, 30x  5#, 30x               SLR 4 way 30x  #1 5 x30  3#, 30x  5#, 30x                                                                                                                                                                                                                                                                                                                     Modalities  3/29  4/2  4/5  4/9               HV with CP 15 min  15 min  15 min  15 min

## 2018-04-10 ENCOUNTER — APPOINTMENT (OUTPATIENT)
Dept: PHYSICAL THERAPY | Facility: CLINIC | Age: 28
End: 2018-04-10
Payer: COMMERCIAL

## 2018-04-12 ENCOUNTER — OFFICE VISIT (OUTPATIENT)
Dept: PHYSICAL THERAPY | Facility: CLINIC | Age: 28
End: 2018-04-12
Payer: COMMERCIAL

## 2018-04-12 DIAGNOSIS — M25.472 LEFT ANKLE SWELLING: ICD-10-CM

## 2018-04-12 DIAGNOSIS — M25.572 ACUTE LEFT ANKLE PAIN: Primary | ICD-10-CM

## 2018-04-12 PROCEDURE — 97112 NEUROMUSCULAR REEDUCATION: CPT | Performed by: PHYSICAL THERAPIST

## 2018-04-12 PROCEDURE — 97110 THERAPEUTIC EXERCISES: CPT | Performed by: PHYSICAL THERAPIST

## 2018-04-12 PROCEDURE — 97014 ELECTRIC STIMULATION THERAPY: CPT | Performed by: PHYSICAL THERAPIST

## 2018-04-12 NOTE — PROGRESS NOTES
Daily Note     Today's date: 2018  Patient name: Mey Barnes  : 1990  MRN: [id number]  Referring provider: Valeri Angel MD  Dx:   Encounter Diagnosis     ICD-10-CM    1  Acute left ankle pain M25 572    2  Left ankle swelling M25 472                   Subjective: Pt  Notes visiting MD who states that incision looks better but remains concerned of infection  She will f/u with him on Tuesday  She is to continue with current protocol as normal        Objective: See treatment diary below  Precautions: S/P L Ankle ORIF      Daily Treatment Diary      Manual  3/29  4/2  4/5  4/9  4/12             Gr 1-2 Ankle Mobz 5 min  np  5 min  5 min  5 min             PROM into restrictions 5 min  5 min  5 min  5 min  5 min             MRE in [place] -    -                 IASTM -    5 min  3 min                                             Exercise Diary  3/29  4/2  4/5  4/9  4/12             Stationary Bike L1, 5 min  10 min  10 min  10 min  10 min             Gait Training  Level Surf   weight shift/ stair training  with shoe  with shoe  with shoe             TB 4 way [place], 20x  [place] x30 [place], 30x  o, 30x  o, 30x             Seated HR 20x  20x  30x  30x  30x             Baps 3 min  30x  ea  30x ea  30x ea  30x ea             LAQ 30x  #1 5 x30  3#, 30x  5#, 30x  5#, 30x             Leg Curl 30x  #1 5 x30  3#, 30x  5#, 30x  5#, 30x             SLR 4 way 30x  #1 5 x30  3#, 30x  5#, 30x  5#, 30x                                                                                                                                                                                                                                                                                                                   Modalities  3/29  4/2  4/5  4/9  4/12             HV with CP 15 min  15 min  15 min  15 min  15 min                                                                    Assessment: Tolerated treatment well   Patient demonstrated fatigue post treatment, exhibited good technique with therapeutic exercises and would benefit from continued PT  Pt  Pain free DF remains restricted, but is slowly improving  Increased erythema noticed around incision today  Pt  Has seen MD for suspected infection  She notes ankle all in all feels well during gait but continues to have reduced heel strike which is temporarily improved with cuing  Plan: Continue per plan of care  Progress treament per protocol

## 2018-04-17 ENCOUNTER — OFFICE VISIT (OUTPATIENT)
Dept: PHYSICAL THERAPY | Facility: CLINIC | Age: 28
End: 2018-04-17
Payer: COMMERCIAL

## 2018-04-17 DIAGNOSIS — M25.472 LEFT ANKLE SWELLING: ICD-10-CM

## 2018-04-17 DIAGNOSIS — M25.572 ACUTE LEFT ANKLE PAIN: Primary | ICD-10-CM

## 2018-04-17 PROCEDURE — 97110 THERAPEUTIC EXERCISES: CPT | Performed by: PHYSICAL THERAPIST

## 2018-04-17 NOTE — PROGRESS NOTES
Daily Note     Today's date: 2018  Patient name: Jessica Zheng  : 1990  MRN: [id number]  Referring provider: Lorri Magaña MD  Dx:   Encounter Diagnosis     ICD-10-CM    1  Acute left ankle pain M25 572    2  Left ankle swelling M25 472                   Subjective: Pt  Is scheduled to see MD this afternoon to discuss suspected infection  Pt  Continues to deny fever/chills but states that she experienced mild yellow discharge from incision this weekend        Objective: See treatment diary below  Precautions: S/P L Ankle ORIF      Daily Treatment Diary      Manual  3/29  4/2  4/5  4/9  4/12  4/17           Gr 1-2 Ankle Mobz 5 min  np  5 min  5 min  5 min  -           PROM into restrictions 5 min  5 min  5 min  5 min  5 min  -           MRE in [place]C -    -                 IASTM -    5 min  3 min                                             Exercise Diary  3/29  4/2  4/5  4/9  4/12  4/17           Stationary Bike L1, 5 min  10 min  10 min  10 min  10 min  10 min           Gait Training  Level Surf   weight shift/ stair training  with shoe  with shoe  with shoe  -           TB 4 way [place], 20x  [place] x30 [place], 30x  o, 30x  o, 30x  o, 30x           Seated HR 20x  20x  30x  30x  30x  30x           Baps 3 min  30x  ea  30x ea  30x ea  30x ea  30x, ea           LAQ 30x  #1 5 x30  3#, 30x  5#, 30x  5#, 30x  --           Leg Curl 30x  #1 5 x30  3#, 30x  5#, 30x  5#, 30x  -           SLR 4 way 30x  #1 5 x30  3#, 30x  5#, 30x  5#, 30x  -                                                                                                                                                                                                                                                                                                                 Modalities  3/29  4/2  4/5  4/9  4/12  4/17           HV with CP 15 min  15 min  15 min  15 min  15 min                                                                  Assessment: Pt  Seen in PT today noting drainage from incision over the weekend  Pt  Has incresaed yellow coloration with mild purulent drainage  Pt  Has increased erythema as compared to prior visits at superior incision  She notes compliance with hygeine and care  Pt  Scheduled to see MD after appointment  Plan: F/u with MD  Immediately following appointment and call with update

## 2018-04-19 ENCOUNTER — APPOINTMENT (OUTPATIENT)
Dept: PHYSICAL THERAPY | Facility: CLINIC | Age: 28
End: 2018-04-19
Payer: COMMERCIAL

## 2018-04-23 ENCOUNTER — APPOINTMENT (OUTPATIENT)
Dept: PHYSICAL THERAPY | Facility: CLINIC | Age: 28
End: 2018-04-23
Payer: COMMERCIAL

## 2018-04-26 ENCOUNTER — APPOINTMENT (OUTPATIENT)
Dept: PHYSICAL THERAPY | Facility: CLINIC | Age: 28
End: 2018-04-26
Payer: COMMERCIAL

## 2018-04-30 ENCOUNTER — APPOINTMENT (OUTPATIENT)
Dept: PHYSICAL THERAPY | Facility: CLINIC | Age: 28
End: 2018-04-30
Payer: COMMERCIAL
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
## 2021-07-26 ENCOUNTER — OFFICE VISIT (OUTPATIENT)
Dept: URGENT CARE | Facility: CLINIC | Age: 31
End: 2021-07-26
Payer: COMMERCIAL

## 2021-07-26 VITALS
OXYGEN SATURATION: 100 % | HEIGHT: 64 IN | RESPIRATION RATE: 18 BRPM | BODY MASS INDEX: 47.8 KG/M2 | DIASTOLIC BLOOD PRESSURE: 96 MMHG | TEMPERATURE: 97.8 F | WEIGHT: 280 LBS | HEART RATE: 85 BPM | SYSTOLIC BLOOD PRESSURE: 170 MMHG

## 2021-07-26 DIAGNOSIS — J06.9 ACUTE URI: Primary | ICD-10-CM

## 2021-07-26 LAB — S PYO AG THROAT QL: NEGATIVE

## 2021-07-26 PROCEDURE — G0382 LEV 3 HOSP TYPE B ED VISIT: HCPCS | Performed by: PHYSICIAN ASSISTANT

## 2021-07-26 PROCEDURE — 87070 CULTURE OTHR SPECIMN AEROBIC: CPT | Performed by: PHYSICIAN ASSISTANT

## 2021-07-26 PROCEDURE — 87880 STREP A ASSAY W/OPTIC: CPT | Performed by: PHYSICIAN ASSISTANT

## 2021-07-26 RX ORDER — FLUTICASONE PROPIONATE 50 MCG
2 SPRAY, SUSPENSION (ML) NASAL DAILY
Qty: 1 G | Refills: 0 | Status: SHIPPED | OUTPATIENT
Start: 2021-07-26

## 2021-07-26 RX ORDER — AMOXICILLIN 500 MG/1
500 CAPSULE ORAL EVERY 8 HOURS SCHEDULED
Qty: 30 CAPSULE | Refills: 0 | Status: SHIPPED | OUTPATIENT
Start: 2021-07-26 | End: 2021-08-05

## 2021-07-26 RX ORDER — SERTRALINE HYDROCHLORIDE 25 MG/1
25 TABLET, FILM COATED ORAL DAILY
COMMUNITY

## 2021-07-26 NOTE — PROGRESS NOTES
[facility]        NAME: Royer Solorio is a 27 y o  female  : 1990    MRN: [id number]  DATE: 2021  TIME: 6:25 PM    Assessment and Plan   Acute URI [J06 9]  1  Acute URI  POCT rapid strepA    Throat culture    fluticasone (FLONASE) 50 mcg/act nasal spray    loratadine-pseudoephedrine (CLARITIN-D 12-HOUR) 5-120 mg per tablet    amoxicillin (AMOXIL) 500 mg capsule         Patient Instructions       Follow up with PCP in 3-5 days  Proceed to  ER if symptoms worsen  Chief Complaint     Chief Complaint   Patient presents with    Sore Throat     sore throat, sinus drainage, starting to lose voice, ear pain for a few days  History of Present Illness       Patient is c/o sore throat, sinus drainage, bilateral ear pain/pressure for the past 5-6 days  Patient reports symptoms are not improving  Patient reports noticing white spots posterior pharynx  Patient denies fever, chills, CP, SOB, N/V/D  Pt is in NAD  Sore Throat   This is a new problem  The current episode started in the past 7 days  The problem has been waxing and waning  There has been no fever  Associated symptoms include congestion and ear pain  Pertinent negatives include no abdominal pain, coughing, shortness of breath or vomiting  Review of Systems   Review of Systems   Constitutional: Negative for chills and fever  HENT: Positive for congestion, ear pain, sinus pressure and sore throat  Eyes: Negative for pain and visual disturbance  Respiratory: Negative for cough and shortness of breath  Cardiovascular: Negative for chest pain and palpitations  Gastrointestinal: Negative for abdominal pain and vomiting  Genitourinary: Negative for dysuria and hematuria  Musculoskeletal: Negative for arthralgias and back pain  Skin: Negative for color change and rash  Neurological: Negative for seizures and syncope  All other systems reviewed and are negative          Current Medications       Current Outpatient Medications:     sertraline (ZOLOFT) 25 mg tablet, Take 25 mg by mouth daily, Disp: , Rfl:     acetaminophen (TYLENOL) 100 mg/mL solution, Take 10 mg/kg by mouth every 4 (four) hours as needed for fever (Patient not taking: Reported on 2021), Disp: , Rfl:     amoxicillin (AMOXIL) 500 mg capsule, Take 1 capsule (500 mg total) by mouth every 8 (eight) hours for 10 days, Disp: 30 capsule, Rfl: 0    aspirin 325 mg tablet, Take 325 mg by mouth daily (Patient not taking: Reported on 2021), Disp: , Rfl:     fluticasone (FLONASE) 50 mcg/act nasal spray, 2 sprays into each nostril daily, Disp: 1 g, Rfl: 0    loratadine-pseudoephedrine (CLARITIN-D 12-HOUR) 5-120 mg per tablet, Take 1 tablet by mouth 2 (two) times a day, Disp: 60 tablet, Rfl: 0    Current Allergies     Allergies as of 2021    (No Known Allergies)            The following portions of the patient's history were reviewed and updated as appropriate: allergies, current medications, past family history, past medical history, past social history, past surgical history and problem list      Past Medical History:   Diagnosis Date    Anxiety        Past Surgical History:   Procedure Laterality Date    ANKLE FRACTURE SURGERY Left      SECTION      KNEE ARTHROSCOPY Right     TONSILLECTOMY         History reviewed  No pertinent family history  Medications have been verified  Objective   /96   Pulse 85   Temp 97 8 °F (36 6 °C)   Resp 18   Ht 5' 4" (1 626 m)   Wt 127 kg (280 lb)   LMP 2021   SpO2 100%   BMI 48 06 kg/m²   Patient's last menstrual period was 2021  Physical Exam     Physical Exam  Constitutional:       Appearance: Normal appearance  HENT:      Head: Normocephalic and atraumatic  Left Ear: Tympanic membrane is erythematous  Nose: Nose normal       Mouth/Throat:      Mouth: Mucous membranes are dry  Pharynx: Posterior oropharyngeal erythema present     Eyes: Extraocular Movements: Extraocular movements intact  Conjunctiva/sclera: Conjunctivae normal       Pupils: Pupils are equal, round, and reactive to light  Cardiovascular:      Rate and Rhythm: Normal rate  Pulmonary:      Effort: Pulmonary effort is normal    Musculoskeletal:         General: Normal range of motion  Cervical back: Normal range of motion and neck supple  Skin:     General: Skin is warm and dry  Capillary Refill: Capillary refill takes less than 2 seconds  Neurological:      General: No focal deficit present  Mental Status: She is alert and oriented to person, place, and time     Psychiatric:         Mood and Affect: Mood normal          Behavior: Behavior normal

## 2021-07-28 LAB — BACTERIA THROAT CULT: NORMAL

## 2022-12-15 ENCOUNTER — OFFICE VISIT (OUTPATIENT)
Dept: URGENT CARE | Facility: CLINIC | Age: 32
End: 2022-12-15

## 2022-12-15 VITALS
HEART RATE: 79 BPM | TEMPERATURE: 98.3 F | SYSTOLIC BLOOD PRESSURE: 172 MMHG | DIASTOLIC BLOOD PRESSURE: 98 MMHG | WEIGHT: 250 LBS | RESPIRATION RATE: 18 BRPM | HEIGHT: 64 IN | OXYGEN SATURATION: 99 % | BODY MASS INDEX: 42.68 KG/M2

## 2022-12-15 DIAGNOSIS — L30.9 DERMATITIS: Primary | ICD-10-CM

## 2022-12-15 NOTE — PROGRESS NOTES
[facility]        NAME: Darion Mcmahon is a 34 y o  female  : 1990    MRN: [id number]  DATE: December 15, 2022  TIME: 2:26 PM    Assessment and Plan   Dermatitis [L30 9]  1  Dermatitis          Likely irritation from shaving or a mild form of folliculitis  Patient Instructions   Wash with gentle soap and water  Hydrocortisone cream   Continue to monitor  Follow up with PCP in 3-5 days  Proceed to  ER if symptoms worsen  Chief Complaint     Chief Complaint   Patient presents with   • Tattoo problem     Symptoms started 1 week ago she started to get chills after getting her tattoo done on her upper right thigh  She says no chills today however she noticed a few red dots around her tattoo  History of Present Illness       Patient is a 27-year-old female with a significant past medical history presents the office complaining of red dots surrounding tattoo that she received 1 week ago  States she has had some chills but denies fevers  Ports part of the tattoo was red and swollen but that has resolved  She had a new        Review of Systems   Review of Systems   Constitutional: Positive for chills  Negative for fever  HENT: Negative for congestion  Respiratory: Negative for cough  Gastrointestinal: Negative for diarrhea, nausea and vomiting  Skin: Positive for rash           Current Medications       Current Outpatient Medications:   •  acetaminophen (TYLENOL) 100 mg/mL solution, Take 10 mg/kg by mouth every 4 (four) hours as needed for fever (Patient not taking: Reported on 2021), Disp: , Rfl:   •  aspirin 325 mg tablet, Take 325 mg by mouth daily (Patient not taking: Reported on 2021), Disp: , Rfl:   •  fluticasone (FLONASE) 50 mcg/act nasal spray, 2 sprays into each nostril daily (Patient not taking: Reported on 12/15/2022), Disp: 1 g, Rfl: 0  •  loratadine-pseudoephedrine (CLARITIN-D 12-HOUR) 5-120 mg per tablet, Take 1 tablet by mouth 2 (two) times a day (Patient not taking: Reported on 12/15/2022), Disp: 60 tablet, Rfl: 0  •  sertraline (ZOLOFT) 25 mg tablet, Take 25 mg by mouth daily (Patient not taking: Reported on 12/15/2022), Disp: , Rfl:     Current Allergies     Allergies as of 12/15/2022   • (No Known Allergies)            The following portions of the patient's history were reviewed and updated as appropriate: allergies, current medications, past family history, past medical history, past social history, past surgical history and problem list      Past Medical History:   Diagnosis Date   • Anxiety        Past Surgical History:   Procedure Laterality Date   • ANKLE FRACTURE SURGERY Left    •  SECTION     • KNEE ARTHROSCOPY Right    • TONSILLECTOMY         History reviewed  No pertinent family history  Medications have been verified  Objective   BP (!) 172/98   Pulse 79   Temp 98 3 °F (36 8 °C)   Resp 18   Ht 5' 4" (1 626 m)   Wt 113 kg (250 lb)   LMP 2022   SpO2 99%   BMI 42 91 kg/m²   Patient's last menstrual period was 2022  Physical Exam     Physical Exam  Vitals and nursing note reviewed  Constitutional:       Appearance: She is well-developed  HENT:      Head: Normocephalic and atraumatic  Right Ear: External ear normal       Left Ear: External ear normal       Nose: Nose normal    Eyes:      General: Lids are normal       Conjunctiva/sclera: Conjunctivae normal    Skin:     General: Skin is warm and dry  Capillary Refill: Capillary refill takes less than 2 seconds  Findings: Rash (Discrete round erythematous papular rash surrounding tattoo on right lateral thigh  See image ) present  Neurological:      Mental Status: She is alert

## 2023-01-12 ENCOUNTER — OFFICE VISIT (OUTPATIENT)
Dept: URGENT CARE | Facility: CLINIC | Age: 33
End: 2023-01-12

## 2023-01-12 VITALS
TEMPERATURE: 98 F | WEIGHT: 250 LBS | HEIGHT: 64 IN | DIASTOLIC BLOOD PRESSURE: 74 MMHG | HEART RATE: 86 BPM | OXYGEN SATURATION: 96 % | SYSTOLIC BLOOD PRESSURE: 145 MMHG | BODY MASS INDEX: 42.68 KG/M2 | RESPIRATION RATE: 18 BRPM

## 2023-01-12 DIAGNOSIS — J32.9 SINOBRONCHITIS: Primary | ICD-10-CM

## 2023-01-12 DIAGNOSIS — J40 SINOBRONCHITIS: Primary | ICD-10-CM

## 2023-01-12 RX ORDER — GUAIFENESIN 600 MG/1
1200 TABLET, EXTENDED RELEASE ORAL EVERY 12 HOURS SCHEDULED
COMMUNITY

## 2023-01-12 RX ORDER — BENZONATATE 200 MG/1
200 CAPSULE ORAL 3 TIMES DAILY PRN
Qty: 20 CAPSULE | Refills: 0 | Status: SHIPPED | OUTPATIENT
Start: 2023-01-12 | End: 2023-01-26

## 2023-01-12 RX ORDER — OXYMETAZOLINE HYDROCHLORIDE 0.05 G/100ML
2 SPRAY NASAL 2 TIMES DAILY
COMMUNITY

## 2023-01-12 RX ORDER — AZITHROMYCIN 250 MG/1
TABLET, FILM COATED ORAL
Qty: 6 TABLET | Refills: 0 | Status: SHIPPED | OUTPATIENT
Start: 2023-01-12 | End: 2023-01-16

## 2023-01-12 RX ORDER — PREDNISONE 10 MG/1
10 TABLET ORAL DAILY
Qty: 21 TABLET | Refills: 0 | Status: SHIPPED | OUTPATIENT
Start: 2023-01-12

## 2023-01-12 RX ORDER — ALBUTEROL SULFATE 90 UG/1
2 AEROSOL, METERED RESPIRATORY (INHALATION)
COMMUNITY
Start: 2018-01-10

## 2023-01-12 NOTE — LETTER
January 12, 2023     Patient: Som Chen   YOB: 1990   Date of Visit: 1/12/2023       To Whom It May Concern: It is my medical opinion that Som Chen may return to full duty immediately with no restrictions             Sincerely,        Jimi Lucas PA-C

## 2023-01-12 NOTE — PROGRESS NOTES
[facility]        NAME: Manpreet Fregoso is a 28 y o  female  : 1990    MRN: [id number]  DATE: 2023  TIME: 8:21 AM    Assessment and Plan   Sinobronchitis [J32 9, J40]  1  Sinobronchitis  predniSONE 10 mg tablet    benzonatate (TESSALON) 200 MG capsule    azithromycin (ZITHROMAX) 250 mg tablet            Patient Instructions   Medications as prescribed  Drink plenty of fluids  Take deep breaths  Tylenol  Follow up with PCP in 3-5 days  Proceed to  ER if symptoms worsen  Chief Complaint     Chief Complaint   Patient presents with   • Cold Like Symptoms     Cold symptoms for about 2 weeks including cough         History of Present Illness       Patient is a 34-year-old female with significant past medical history of seasonal allergies presents the office complaining of congestion, rhinorrhea, mild sore throat, cough for 2 weeks  She denies fever, chills, SOB, CP, nausea, vomiting, abdominal pain or rashes  Over-the-counter cold and flu medications provide temporary relief but overall symptoms or not improving  Review of Systems   Review of Systems   Constitutional: Negative for chills and fever  HENT: Positive for congestion, postnasal drip, rhinorrhea and sore throat  Respiratory: Positive for cough  Negative for shortness of breath  Cardiovascular: Negative for chest pain and palpitations  Gastrointestinal: Negative for abdominal pain, diarrhea, nausea and vomiting  Musculoskeletal: Negative for myalgias  Neurological: Negative for dizziness, light-headedness and headaches           Current Medications       Current Outpatient Medications:   •  albuterol (PROVENTIL HFA,VENTOLIN HFA) 90 mcg/act inhaler, Inhale 2 puffs, Disp: , Rfl:   •  azithromycin (ZITHROMAX) 250 mg tablet, Take 2 tablets today then 1 tablet daily x 4 days, Disp: 6 tablet, Rfl: 0  •  benzonatate (TESSALON) 200 MG capsule, Take 1 capsule (200 mg total) by mouth 3 (three) times a day as needed for cough for up to 14 days, Disp: 20 capsule, Rfl: 0  •  guaiFENesin (MUCINEX) 600 mg 12 hr tablet, Take 1,200 mg by mouth every 12 (twelve) hours, Disp: , Rfl:   •  oxymetazoline (AFRIN) 0 05 % nasal spray, 2 sprays by Each Nare route 2 (two) times a day, Disp: , Rfl:   •  predniSONE 10 mg tablet, Take 1 tablet (10 mg total) by mouth daily Take 6 on day 1, take 5 on day 2, take 4 on day 3, take 3 on day 4, take 2 on day 5, take 1 on day 6 , Disp: 21 tablet, Rfl: 0  •  acetaminophen (TYLENOL) 100 mg/mL solution, Take 10 mg/kg by mouth every 4 (four) hours as needed for fever (Patient not taking: Reported on 2021), Disp: , Rfl:   •  aspirin 325 mg tablet, Take 325 mg by mouth daily (Patient not taking: Reported on 2021), Disp: , Rfl:   •  fluticasone (FLONASE) 50 mcg/act nasal spray, 2 sprays into each nostril daily (Patient not taking: Reported on 12/15/2022), Disp: 1 g, Rfl: 0  •  loratadine-pseudoephedrine (CLARITIN-D 12-HOUR) 5-120 mg per tablet, Take 1 tablet by mouth 2 (two) times a day (Patient not taking: Reported on 12/15/2022), Disp: 60 tablet, Rfl: 0  •  sertraline (ZOLOFT) 25 mg tablet, Take 25 mg by mouth daily (Patient not taking: Reported on 12/15/2022), Disp: , Rfl:     Current Allergies     Allergies as of 2023   • (No Known Allergies)            The following portions of the patient's history were reviewed and updated as appropriate: allergies, current medications, past family history, past medical history, past social history, past surgical history and problem list      Past Medical History:   Diagnosis Date   • Anxiety    • Seasonal allergies        Past Surgical History:   Procedure Laterality Date   • ANKLE FRACTURE SURGERY Left    •  SECTION     • KNEE ARTHROSCOPY Right    • TONSILLECTOMY         No family history on file  Medications have been verified          Objective   /74   Pulse 86   Temp 98 °F (36 7 °C)   Resp 18   Ht 5' 4" (1 626 m)   Wt 113 kg (250 lb)   LMP 01/01/2023   SpO2 96%   BMI 42 91 kg/m²   Patient's last menstrual period was 01/01/2023  Physical Exam     Physical Exam  Vitals and nursing note reviewed  Constitutional:       Appearance: Normal appearance  She is well-developed  HENT:      Head: Normocephalic and atraumatic  Right Ear: Tympanic membrane, ear canal and external ear normal       Left Ear: Tympanic membrane, ear canal and external ear normal       Nose: Congestion and rhinorrhea present  Mouth/Throat:      Pharynx: Uvula midline  Eyes:      General: Lids are normal       Conjunctiva/sclera: Conjunctivae normal       Pupils: Pupils are equal, round, and reactive to light  Cardiovascular:      Rate and Rhythm: Normal rate and regular rhythm  Pulses: Normal pulses  Heart sounds: Normal heart sounds  No murmur heard  No friction rub  No gallop  Pulmonary:      Effort: Pulmonary effort is normal       Breath sounds: Normal breath sounds  No wheezing, rhonchi or rales  Musculoskeletal:         General: Normal range of motion  Cervical back: Neck supple  Lymphadenopathy:      Cervical: No cervical adenopathy  Skin:     General: Skin is warm and dry  Capillary Refill: Capillary refill takes less than 2 seconds  Neurological:      Mental Status: She is alert

## 2023-04-27 ENCOUNTER — OFFICE VISIT (OUTPATIENT)
Dept: URGENT CARE | Facility: CLINIC | Age: 33
End: 2023-04-27

## 2023-04-27 VITALS
SYSTOLIC BLOOD PRESSURE: 134 MMHG | TEMPERATURE: 96.9 F | HEART RATE: 68 BPM | DIASTOLIC BLOOD PRESSURE: 77 MMHG | WEIGHT: 263 LBS | HEIGHT: 64 IN | RESPIRATION RATE: 16 BRPM | OXYGEN SATURATION: 98 % | BODY MASS INDEX: 44.9 KG/M2

## 2023-04-27 DIAGNOSIS — T14.8XXA HEMATOMA: Primary | ICD-10-CM

## 2023-04-27 RX ORDER — MULTIVIT-MIN/IRON FUM/FOLIC AC 7.5 MG-4
1 TABLET ORAL DAILY
COMMUNITY

## 2023-04-27 NOTE — PROGRESS NOTES
[facility]        NAME: Felicia Rehman is a 28 y o  female  : 1990    MRN: [id number]  DATE: 2023  TIME: 10:45 AM    Assessment and Plan   Hematoma [T14  8XXA]  1  Hematoma              Patient Instructions     If you are concerned for a blood clot you need to go to the ER   Use warm compresses Follow up with PCP in 3-5 days  Proceed to  ER if symptoms worsen  Chief Complaint     Chief Complaint   Patient presents with   • Arm Pain     Right arm pain and bruising starting   Didn't hit it on anything  Unsure of what caused it          History of Present Illness       Patient is a 33YOF presenting with bruising in her right arm  She states she noticed it on   She denies any injury  She was concerned for a blood clot  She denies any chest pain , shortness of breath, recent travel, no birth control use or smoking  Arm Pain   Pertinent negatives include no chest pain  Review of Systems   Review of Systems   Respiratory: Negative for shortness of breath  Cardiovascular: Negative for chest pain and palpitations  Hematological: Bruises/bleeds easily  All other systems reviewed and are negative          Current Medications       Current Outpatient Medications:   •  albuterol (PROVENTIL HFA,VENTOLIN HFA) 90 mcg/act inhaler, Inhale 2 puffs, Disp: , Rfl:   •  Multiple Vitamins-Minerals (multivitamin with minerals) tablet, Take 1 tablet by mouth daily, Disp: , Rfl:   •  acetaminophen (TYLENOL) 100 mg/mL solution, Take 10 mg/kg by mouth every 4 (four) hours as needed for fever (Patient not taking: Reported on 2021), Disp: , Rfl:   •  aspirin 325 mg tablet, Take 325 mg by mouth daily (Patient not taking: Reported on 2021), Disp: , Rfl:   •  fluticasone (FLONASE) 50 mcg/act nasal spray, 2 sprays into each nostril daily (Patient not taking: Reported on 12/15/2022), Disp: 1 g, Rfl: 0  •  guaiFENesin (MUCINEX) 600 mg 12 hr tablet, Take 1,200 mg by mouth every 12 (twelve) "hours (Patient not taking: Reported on 2023), Disp: , Rfl:   •  loratadine-pseudoephedrine (CLARITIN-D 12-HOUR) 5-120 mg per tablet, Take 1 tablet by mouth 2 (two) times a day (Patient not taking: Reported on 12/15/2022), Disp: 60 tablet, Rfl: 0  •  oxymetazoline (AFRIN) 0 05 % nasal spray, 2 sprays by Each Nare route 2 (two) times a day (Patient not taking: Reported on 2023), Disp: , Rfl:   •  predniSONE 10 mg tablet, Take 1 tablet (10 mg total) by mouth daily Take 6 on day 1, take 5 on day 2, take 4 on day 3, take 3 on day 4, take 2 on day 5, take 1 on day 6  (Patient not taking: Reported on 2023), Disp: 21 tablet, Rfl: 0  •  sertraline (ZOLOFT) 25 mg tablet, Take 25 mg by mouth daily (Patient not taking: Reported on 12/15/2022), Disp: , Rfl:     Current Allergies     Allergies as of 2023   • (No Known Allergies)            The following portions of the patient's history were reviewed and updated as appropriate: allergies, current medications, past family history, past medical history, past social history, past surgical history and problem list      Past Medical History:   Diagnosis Date   • Anxiety    • Seasonal allergies        Past Surgical History:   Procedure Laterality Date   • ANKLE FRACTURE SURGERY Left    •  SECTION     • KNEE ARTHROSCOPY Right    • TONSILLECTOMY     • TUBAL LIGATION         History reviewed  No pertinent family history  Medications have been verified  Objective   /77   Pulse 68   Temp (!) 96 9 °F (36 1 °C)   Resp 16   Ht 5' 4\" (1 626 m)   Wt 119 kg (263 lb)   SpO2 98%   BMI 45 14 kg/m²        Physical Exam     Physical Exam  Vitals and nursing note reviewed  Constitutional:       General: She is not in acute distress  Appearance: Normal appearance  She is normal weight  She is not ill-appearing or toxic-appearing  Cardiovascular:      Rate and Rhythm: Normal rate and regular rhythm  Pulses: Normal pulses        Heart " sounds: Normal heart sounds  Pulmonary:      Effort: Pulmonary effort is normal       Breath sounds: Normal breath sounds  Skin:     Findings: Bruising (on the right antecubital area ) present  Neurological:      Mental Status: She is alert

## 2023-04-28 ENCOUNTER — APPOINTMENT (EMERGENCY)
Dept: NON INVASIVE DIAGNOSTICS | Facility: HOSPITAL | Age: 33
End: 2023-04-28

## 2023-04-28 ENCOUNTER — HOSPITAL ENCOUNTER (EMERGENCY)
Facility: HOSPITAL | Age: 33
Discharge: HOME | End: 2023-04-28
Attending: EMERGENCY MEDICINE

## 2023-04-28 VITALS
OXYGEN SATURATION: 98 % | SYSTOLIC BLOOD PRESSURE: 149 MMHG | RESPIRATION RATE: 17 BRPM | BODY MASS INDEX: 44.9 KG/M2 | HEART RATE: 71 BPM | DIASTOLIC BLOOD PRESSURE: 90 MMHG | TEMPERATURE: 97.1 F | WEIGHT: 263 LBS | HEIGHT: 64 IN

## 2023-04-28 DIAGNOSIS — T14.8XXA HEMATOMA: Primary | ICD-10-CM

## 2023-04-28 LAB
APTT PPP: 27 SECONDS (ref 23–37)
BASOPHILS # BLD AUTO: 0.04 THOUSANDS/ΜL (ref 0–0.1)
BASOPHILS NFR BLD AUTO: 1 % (ref 0–1)
EOSINOPHIL # BLD AUTO: 0.11 THOUSAND/ΜL (ref 0–0.61)
EOSINOPHIL NFR BLD AUTO: 2 % (ref 0–6)
ERYTHROCYTE [DISTWIDTH] IN BLOOD BY AUTOMATED COUNT: 13.3 % (ref 11.6–15.1)
HCT VFR BLD AUTO: 39.9 % (ref 34.8–46.1)
HGB BLD-MCNC: 13 G/DL (ref 11.5–15.4)
IMM GRANULOCYTES # BLD AUTO: 0.03 THOUSAND/UL (ref 0–0.2)
IMM GRANULOCYTES NFR BLD AUTO: 0 % (ref 0–2)
INR PPP: 1 (ref 0.84–1.19)
LYMPHOCYTES # BLD AUTO: 2.5 THOUSANDS/ΜL (ref 0.6–4.47)
LYMPHOCYTES NFR BLD AUTO: 34 % (ref 14–44)
MCH RBC QN AUTO: 28 PG (ref 26.8–34.3)
MCHC RBC AUTO-ENTMCNC: 32.6 G/DL (ref 31.4–37.4)
MCV RBC AUTO: 86 FL (ref 82–98)
MONOCYTES # BLD AUTO: 0.63 THOUSAND/ΜL (ref 0.17–1.22)
MONOCYTES NFR BLD AUTO: 9 % (ref 4–12)
NEUTROPHILS # BLD AUTO: 3.98 THOUSANDS/ΜL (ref 1.85–7.62)
NEUTS SEG NFR BLD AUTO: 54 % (ref 43–75)
NRBC BLD AUTO-RTO: 0 /100 WBCS
PLATELET # BLD AUTO: 356 THOUSANDS/UL (ref 149–390)
PMV BLD AUTO: 9.7 FL (ref 8.9–12.7)
PROTHROMBIN TIME: 13.3 SECONDS (ref 11.6–14.5)
RBC # BLD AUTO: 4.64 MILLION/UL (ref 3.81–5.12)
WBC # BLD AUTO: 7.29 THOUSAND/UL (ref 4.31–10.16)

## 2023-04-28 NOTE — ED PROVIDER NOTES
History  Chief Complaint   Patient presents with   • Pain     Pain in the left arm  Has a bruise that is painful and states it is worse since yesterday      Patient complains of atraumatic spontaneous bruising of the right arm  Complains of pain in the arm  Was seen in urgent care yesterday and told that it was a hematoma  Patient is not on any blood thinners  No trouble with nosebleeds or bleeding gums  Patient does states she does bruise easily but this is more so than normal   She never has pain with the bruises and now she has pain from the wrist all the way up to the upper arm  No chest pain  No shortness of breath  No bloody stools  No hematuria  History provided by:  Patient   used: No    Arm Pain  Location:  Right arm  Quality:  Achy  Severity:  Mild  Onset quality:  Gradual  Duration:  1 day  Timing:  Constant  Progression:  Unchanged  Chronicity:  New  Context:  Atraumatic bruising to right arm for 1 day  Relieved by:  Nothing  Worsened by:  Palpation  Ineffective treatments:  None tried  Associated symptoms: no abdominal pain, no chest pain, no congestion, no cough, no diarrhea, no ear pain, no fever, no headaches, no loss of consciousness, no nausea, no rash, no rhinorrhea, no shortness of breath, no sore throat, no vomiting and no wheezing        Prior to Admission Medications   Prescriptions Last Dose Informant Patient Reported? Taking? Multiple Vitamins-Minerals (multivitamin with minerals) tablet   Yes No   Sig: Take 1 tablet by mouth daily   albuterol (PROVENTIL HFA,VENTOLIN HFA) 90 mcg/act inhaler   Yes No   Sig: Inhale 2 puffs      Facility-Administered Medications: None       Past Medical History:   Diagnosis Date   • Anxiety    • Seasonal allergies        Past Surgical History:   Procedure Laterality Date   • ANKLE FRACTURE SURGERY Left    •  SECTION     • KNEE ARTHROSCOPY Right    • TONSILLECTOMY     • TUBAL LIGATION         History reviewed   No pertinent family history  I have reviewed and agree with the history as documented  E-Cigarette/Vaping   • E-Cigarette Use Never User      E-Cigarette/Vaping Substances     Social History     Tobacco Use   • Smoking status: Never   • Smokeless tobacco: Never   Vaping Use   • Vaping Use: Never used   Substance Use Topics   • Alcohol use: Yes     Comment: social   • Drug use: Not Currently       Review of Systems   Constitutional: Negative for chills and fever  HENT: Negative for congestion, ear pain, hearing loss, rhinorrhea, sore throat, trouble swallowing and voice change  Eyes: Negative for pain and discharge  Respiratory: Negative for cough, shortness of breath and wheezing  Cardiovascular: Negative for chest pain and palpitations  Gastrointestinal: Negative for abdominal pain, blood in stool, constipation, diarrhea, nausea and vomiting  Genitourinary: Negative for dysuria, flank pain, frequency and hematuria  Musculoskeletal: Negative for joint swelling, neck pain and neck stiffness  Skin: Negative for rash and wound  Neurological: Negative for dizziness, seizures, loss of consciousness, syncope, facial asymmetry and headaches  Hematological: Bruises/bleeds easily  Psychiatric/Behavioral: Negative for hallucinations, self-injury and suicidal ideas  All other systems reviewed and are negative  Physical Exam  Physical Exam  Vitals and nursing note reviewed  Constitutional:       General: She is not in acute distress  Appearance: She is well-developed  HENT:      Head: Normocephalic and atraumatic  Right Ear: External ear normal       Left Ear: External ear normal    Eyes:      General: No scleral icterus  Right eye: No discharge  Left eye: No discharge  Extraocular Movements: Extraocular movements intact  Conjunctiva/sclera: Conjunctivae normal    Cardiovascular:      Rate and Rhythm: Normal rate and regular rhythm        Heart sounds: Normal heart sounds  No murmur heard  Pulmonary:      Effort: Pulmonary effort is normal       Breath sounds: Normal breath sounds  No wheezing or rales  Abdominal:      General: Bowel sounds are normal  There is no distension  Palpations: Abdomen is soft  Tenderness: There is no abdominal tenderness  There is no guarding or rebound  Musculoskeletal:         General: No deformity  Normal range of motion  Cervical back: Normal range of motion and neck supple  Comments: Right upper extremity with diffuse tenderness  There is swelling noted in the forearm antecubital region and elbow  No obvious bony deformity  Radial pulse 2+  Neurovascular intact distally  Skin:     General: Skin is warm and dry  Findings: No rash  Neurological:      General: No focal deficit present  Mental Status: She is alert and oriented to person, place, and time  Cranial Nerves: No cranial nerve deficit  Psychiatric:         Mood and Affect: Mood normal          Behavior: Behavior normal          Thought Content:  Thought content normal          Judgment: Judgment normal          Vital Signs  ED Triage Vitals [04/28/23 1050]   Temperature Pulse Respirations Blood Pressure SpO2   (!) 97 1 °F (36 2 °C) 71 17 149/90 98 %      Temp Source Heart Rate Source Patient Position - Orthostatic VS BP Location FiO2 (%)   Temporal Monitor -- Left arm --      Pain Score       2           Vitals:    04/28/23 1050   BP: 149/90   Pulse: 71         Visual Acuity      ED Medications  Medications - No data to display    Diagnostic Studies  Results Reviewed     Procedure Component Value Units Date/Time    Protime-INR [[id number]]  (Normal) Collected: 04/28/23 1059    Lab Status: Final result Specimen: Blood from Arm, Left Updated: 04/28/23 1118     Protime 13 3 seconds      INR 1 00    APTT [[id number]]  (Normal) Collected: 04/28/23 1059    Lab Status: Final result Specimen: Blood from Arm, Left Updated: 04/28/23 1118     PTT 27 seconds     CBC and differential [[id number]] Collected: 04/28/23 1059    Lab Status: Final result Specimen: Blood from Arm, Left Updated: 04/28/23 1104     WBC 7 29 Thousand/uL      RBC 4 64 Million/uL      Hemoglobin 13 0 g/dL      Hematocrit 39 9 %      MCV 86 fL      MCH 28 0 pg      MCHC 32 6 g/dL      RDW 13 3 %      MPV 9 7 fL      Platelets 528 Thousands/uL      nRBC 0 /100 WBCs      Neutrophils Relative 54 %      Immat GRANS % 0 %      Lymphocytes Relative 34 %      Monocytes Relative 9 %      Eosinophils Relative 2 %      Basophils Relative 1 %      Neutrophils Absolute 3 98 Thousands/µL      Immature Grans Absolute 0 03 Thousand/uL      Lymphocytes Absolute 2 50 Thousands/µL      Monocytes Absolute 0 63 Thousand/µL      Eosinophils Absolute 0 11 Thousand/µL      Basophils Absolute 0 04 Thousands/µL                  VAS upper limb venous duplex scan, unilateral/limited    (Results Pending)              Procedures  Procedures         ED Course  ED Course as of 04/28/23 1150   Fri Apr 28, 2023   1148 No DVT per vascular technician  1149 No signs of infection  Patient has normal platelets  INR is normal                                              MDM    Disposition  Final diagnoses:   Hematoma     Time reflects when diagnosis was documented in both MDM as applicable and the Disposition within this note     Time User Action Codes Description Comment    4/28/2023 11:50 AM Maria Esther Oropeza Add [T14  8XXA] Hematoma       ED Disposition     ED Disposition   Discharge    Condition   Stable    Date/Time   Fri Apr 28, 2023 11:49 AM    Comment   Irena Yancey discharge to home/self care                 Follow-up Information     Follow up With Specialties Details Why Contact Info    Corinna Bowling MD Family Medicine Call in 1 day  [place] [phone]            Patient's Medications   Discharge Prescriptions    No medications on file       No discharge procedures on file     PDMP Review     None          ED Provider  Electronically Signed by           Iker Foster MD  04/28/23 4465

## 2023-07-31 ENCOUNTER — OFFICE VISIT (OUTPATIENT)
Dept: URGENT CARE | Facility: CLINIC | Age: 33
End: 2023-07-31
Payer: COMMERCIAL

## 2023-07-31 VITALS
WEIGHT: 265 LBS | TEMPERATURE: 101.4 F | HEIGHT: 64 IN | OXYGEN SATURATION: 96 % | BODY MASS INDEX: 45.24 KG/M2 | DIASTOLIC BLOOD PRESSURE: 70 MMHG | RESPIRATION RATE: 20 BRPM | HEART RATE: 106 BPM | SYSTOLIC BLOOD PRESSURE: 131 MMHG

## 2023-07-31 DIAGNOSIS — H65.113 ACUTE MUCOID OTITIS MEDIA OF BOTH EARS: ICD-10-CM

## 2023-07-31 DIAGNOSIS — B34.9 VIRAL SYNDROME: Primary | ICD-10-CM

## 2023-07-31 LAB
S PYO AG THROAT QL: NEGATIVE
SARS-COV-2 AG UPPER RESP QL IA: NEGATIVE
VALID CONTROL: NORMAL

## 2023-07-31 PROCEDURE — 99213 OFFICE O/P EST LOW 20 MIN: CPT | Performed by: PHYSICIAN ASSISTANT

## 2023-07-31 PROCEDURE — 87880 STREP A ASSAY W/OPTIC: CPT | Performed by: PHYSICIAN ASSISTANT

## 2023-07-31 PROCEDURE — 87811 SARS-COV-2 COVID19 W/OPTIC: CPT | Performed by: PHYSICIAN ASSISTANT

## 2023-07-31 RX ORDER — AMOXICILLIN 500 MG/1
500 CAPSULE ORAL EVERY 12 HOURS SCHEDULED
Qty: 14 CAPSULE | Refills: 0 | Status: SHIPPED | OUTPATIENT
Start: 2023-07-31 | End: 2023-08-01 | Stop reason: ALTCHOICE

## 2023-07-31 RX ORDER — ACETAMINOPHEN 325 MG/1
975 TABLET ORAL ONCE
Status: COMPLETED | OUTPATIENT
Start: 2023-07-31 | End: 2023-07-31

## 2023-07-31 RX ORDER — ONDANSETRON 4 MG/1
8 TABLET, ORALLY DISINTEGRATING ORAL ONCE
Status: COMPLETED | OUTPATIENT
Start: 2023-07-31 | End: 2023-07-31

## 2023-07-31 RX ORDER — IBUPROFEN 600 MG/1
600 TABLET ORAL ONCE
Status: COMPLETED | OUTPATIENT
Start: 2023-07-31 | End: 2023-07-31

## 2023-07-31 RX ADMIN — IBUPROFEN 600 MG: 600 TABLET ORAL at 14:55

## 2023-07-31 RX ADMIN — ONDANSETRON 8 MG: 4 TABLET, ORALLY DISINTEGRATING ORAL at 14:54

## 2023-07-31 RX ADMIN — ACETAMINOPHEN 975 MG: 325 TABLET ORAL at 14:55

## 2023-07-31 NOTE — PROGRESS NOTES
[facility]        NAME: Tamala Apgar is a 28 y.o. female  : 1990    MRN: [id number]  DATE: 2023  TIME: 3:51 PM    Assessment and Plan   Viral syndrome [B34.9]  1. Viral syndrome  ondansetron (ZOFRAN-ODT) dispersible tablet 8 mg    acetaminophen (TYLENOL) tablet 975 mg    ibuprofen (MOTRIN) tablet 600 mg    POCT rapid strepA    Poct Covid 19 Rapid Antigen Test      2. Acute mucoid otitis media of both ears  amoxicillin (AMOXIL) 500 mg capsule            Patient Instructions   Antibiotics as needed for ear pain. Continue with Tylenol ibuprofen. Small sips of fluids every 15 minutes. Drink plenty fluids. Follow up with PCP in 3-5 days. Proceed to  ER if symptoms worsen. Chief Complaint     Chief Complaint   Patient presents with   • Sore Throat     Threw up just before being called back, body aches, congestion, exhausted, left ear pain          History of Present Illness       Patient is a 69-year-old female with no severe past medical history presents the office complaining of fatigue, bodies, congestion, left ear pain, and sore throat for a few days then began with fever, nausea, and vomiting today. Denies cough, chest pain, shortness of breath, abdominal pain, diarrhea, or rashes. Denies sick contacts. Review of Systems   Review of Systems   Constitutional: Positive for fatigue and fever. Negative for chills. HENT: Positive for congestion, ear pain and sore throat. Respiratory: Negative for cough and shortness of breath. Cardiovascular: Negative for chest pain and palpitations. Gastrointestinal: Positive for nausea and vomiting. Negative for abdominal pain and diarrhea. Musculoskeletal: Positive for myalgias. Skin: Negative for rash. Neurological: Positive for dizziness, light-headedness and headaches.          Current Medications       Current Outpatient Medications:   •  albuterol (PROVENTIL HFA,VENTOLIN HFA) 90 mcg/act inhaler, Inhale 2 puffs, Disp: , Rfl:   •  amoxicillin (AMOXIL) 500 mg capsule, Take 1 capsule (500 mg total) by mouth every 12 (twelve) hours for 7 days, Disp: 14 capsule, Rfl: 0  •  Multiple Vitamins-Minerals (multivitamin with minerals) tablet, Take 1 tablet by mouth daily (Patient not taking: Reported on 2023), Disp: , Rfl:   No current facility-administered medications for this visit. Current Allergies     Allergies as of 2023   • (No Known Allergies)            The following portions of the patient's history were reviewed and updated as appropriate: allergies, current medications, past family history, past medical history, past social history, past surgical history and problem list.     Past Medical History:   Diagnosis Date   • Anxiety    • Seasonal allergies        Past Surgical History:   Procedure Laterality Date   • ANKLE FRACTURE SURGERY Left    •  SECTION     • KNEE ARTHROSCOPY Right    • TONSILLECTOMY     • TUBAL LIGATION         History reviewed. No pertinent family history. Medications have been verified. Objective   /70   Pulse (!) 106   Temp (!) 101.4 °F (38.6 °C)   Resp 20   Ht 5' 4" (1.626 m)   Wt 120 kg (265 lb)   SpO2 96%   BMI 45.49 kg/m²   No LMP recorded. Patient has had an ablation. Physical Exam     Physical Exam  Vitals and nursing note reviewed. Constitutional:       General: She is not in acute distress. Appearance: Normal appearance. She is well-developed. She is ill-appearing. HENT:      Head: Normocephalic and atraumatic. Right Ear: Ear canal and external ear normal. A middle ear effusion is present. Tympanic membrane is erythematous and bulging. Left Ear: Ear canal and external ear normal. A middle ear effusion is present. Tympanic membrane is erythematous and bulging. Nose: Nose normal.      Mouth/Throat:      Mouth: Mucous membranes are dry. Pharynx: Oropharynx is clear. Uvula midline.    Eyes:      General: Lids are normal. Conjunctiva/sclera: Conjunctivae normal.      Pupils: Pupils are equal, round, and reactive to light. Cardiovascular:      Rate and Rhythm: Regular rhythm. Tachycardia present. Pulses: Normal pulses. Heart sounds: Normal heart sounds. No murmur heard. No friction rub. No gallop. Pulmonary:      Effort: Pulmonary effort is normal.      Breath sounds: Normal breath sounds. No wheezing, rhonchi or rales. Abdominal:      General: Bowel sounds are normal.      Palpations: Abdomen is soft. Tenderness: There is no abdominal tenderness. Musculoskeletal:         General: Normal range of motion. Cervical back: Neck supple. Lymphadenopathy:      Cervical: No cervical adenopathy. Skin:     General: Skin is warm and dry. Capillary Refill: Capillary refill takes less than 2 seconds. Neurological:      Mental Status: She is alert.        POC rapid strep negative    POC rapid COVID-19 negative

## 2023-07-31 NOTE — LETTER
July 31, 2023     Patient: Jenny De Souza   YOB: 1990   Date of Visit: 7/31/2023       To Whom It May Concern: It is my medical opinion that Jenny De Souza should remain out of work until fever free .            Sincerely,        Daniela Wan PA-C

## 2023-08-01 ENCOUNTER — APPOINTMENT (OUTPATIENT)
Dept: RADIOLOGY | Facility: CLINIC | Age: 33
End: 2023-08-01
Payer: COMMERCIAL

## 2023-08-01 ENCOUNTER — OFFICE VISIT (OUTPATIENT)
Dept: URGENT CARE | Facility: CLINIC | Age: 33
End: 2023-08-01
Payer: COMMERCIAL

## 2023-08-01 VITALS
HEART RATE: 85 BPM | BODY MASS INDEX: 45.24 KG/M2 | HEIGHT: 64 IN | OXYGEN SATURATION: 98 % | TEMPERATURE: 97.2 F | RESPIRATION RATE: 20 BRPM | DIASTOLIC BLOOD PRESSURE: 74 MMHG | SYSTOLIC BLOOD PRESSURE: 134 MMHG | WEIGHT: 265 LBS

## 2023-08-01 DIAGNOSIS — R07.9 CHEST PAIN, UNSPECIFIED TYPE: ICD-10-CM

## 2023-08-01 DIAGNOSIS — J18.9 PNEUMONIA OF RIGHT MIDDLE LOBE DUE TO INFECTIOUS ORGANISM: Primary | ICD-10-CM

## 2023-08-01 PROCEDURE — 71046 X-RAY EXAM CHEST 2 VIEWS: CPT

## 2023-08-01 PROCEDURE — 99213 OFFICE O/P EST LOW 20 MIN: CPT | Performed by: NURSE PRACTITIONER

## 2023-08-01 RX ORDER — PREDNISONE 50 MG/1
50 TABLET ORAL DAILY
Qty: 5 TABLET | Refills: 0 | Status: SHIPPED | OUTPATIENT
Start: 2023-08-01 | End: 2023-08-06

## 2023-08-01 RX ORDER — IBUPROFEN 200 MG
200 TABLET ORAL EVERY 6 HOURS PRN
COMMUNITY

## 2023-08-01 RX ORDER — ALBUTEROL SULFATE 90 UG/1
2 AEROSOL, METERED RESPIRATORY (INHALATION) EVERY 4 HOURS PRN
Qty: 8.5 G | Refills: 1 | Status: SHIPPED | OUTPATIENT
Start: 2023-08-01

## 2023-08-01 RX ORDER — AZITHROMYCIN 250 MG/1
TABLET, FILM COATED ORAL
Qty: 6 TABLET | Refills: 0 | Status: SHIPPED | OUTPATIENT
Start: 2023-08-01 | End: 2023-08-05

## 2023-08-01 RX ORDER — ALBUTEROL SULFATE 2.5 MG/3ML
2.5 SOLUTION RESPIRATORY (INHALATION) EVERY 4 HOURS PRN
Qty: 90 ML | Refills: 1 | Status: SHIPPED | OUTPATIENT
Start: 2023-08-01

## 2023-08-01 NOTE — PROGRESS NOTES
[facility]        NAME: Pastora Gates is a 28 y.o. female  : 1990    MRN: [id number]  DATE: 2023  TIME: 10:23 AM      Assessment and Plan     Pneumonia of right middle lobe due to infectious organism [J18.9]  1. Pneumonia of right middle lobe due to infectious organism  XR chest pa & lateral    azithromycin (ZITHROMAX) 250 mg tablet    predniSONE 50 mg tablet    albuterol (ProAir HFA) 90 mcg/act inhaler    albuterol (2.5 mg/3 mL) 0.083 % nebulizer solution            Patient Instructions     Follow up with PCP in 3-5 days. Proceed to  ER if symptoms worsen. Chief Complaint     Chief Complaint   Patient presents with   • Cold Like Symptoms     Seen yesterday for sinus infection and ear pain; now reporting back and right sided chest discomfort on inspiration starting yesterday evening, pt states "it hurts to breath"; pt reports productive cough    Taking amoxicillin, motrin, and tylenol for symptoms         History of Present Illness     Patient notes a lot of vomiting since yesterday--wonders about possible aspiration from violent vomiting. States that her right anterior chest did not hurt like this when she was here yesterday. States that at first she thought she pulled something in her chest, but then she noted wheezing, etc and thought she should get it checked out. Did use rescue inhaler this morning; no noticeable relief. Presents accompanied by friend. Review of Systems     Review of Systems   Respiratory: Positive for cough, chest tightness, shortness of breath and wheezing. All other systems reviewed and are negative.         Current Medications       Current Outpatient Medications:   •  Acetaminophen (TYLENOL PO), Take by mouth, Disp: , Rfl:   •  albuterol (2.5 mg/3 mL) 0.083 % nebulizer solution, Take 3 mL (2.5 mg total) by nebulization every 4 (four) hours as needed for wheezing or shortness of breath, Disp: 90 mL, Rfl: 1  •  albuterol (ProAir HFA) 90 mcg/act inhaler, Inhale 2 puffs every 4 (four) hours as needed for wheezing or shortness of breath, Disp: 8.5 g, Rfl: 1  •  Ascorbic Acid (VITAMIN C PO), Take by mouth, Disp: , Rfl:   •  azithromycin (ZITHROMAX) 250 mg tablet, Take 2 tablets today then 1 tablet daily x 4 days, Disp: 6 tablet, Rfl: 0  •  ELDERBERRY PO, Take by mouth, Disp: , Rfl:   •  ibuprofen (MOTRIN) 200 mg tablet, Take 200 mg by mouth every 6 (six) hours as needed, Disp: , Rfl:   •  Multiple Vitamins-Minerals (multivitamin with minerals) tablet, Take 1 tablet by mouth daily, Disp: , Rfl:   •  predniSONE 50 mg tablet, Take 1 tablet (50 mg total) by mouth daily for 5 days, Disp: 5 tablet, Rfl: 0  No current facility-administered medications for this visit. Current Allergies     Allergies as of 2023   • (No Known Allergies)              The following portions of the patient's history were reviewed and updated as appropriate: allergies, current medications, past family history, past medical history, past social history, past surgical history and problem list.     Past Medical History:   Diagnosis Date   • Anxiety    • Seasonal allergies        Past Surgical History:   Procedure Laterality Date   • ANKLE FRACTURE SURGERY Left    •  SECTION     • KNEE ARTHROSCOPY Right    • TONSILLECTOMY     • TUBAL LIGATION         No family history on file. Medications have been verified. Objective     /74   Pulse 85   Temp (!) 97.2 °F (36.2 °C)   Resp 20   Ht 5' 4" (1.626 m)   Wt 120 kg (265 lb)   LMP 2023   SpO2 98%   BMI 45.49 kg/m²   Patient's last menstrual period was 2023. Physical Exam     Physical Exam  Vitals and nursing note reviewed. Constitutional:       General: She is not in acute distress. Appearance: Normal appearance. She is well-developed. She is ill-appearing. She is not toxic-appearing or diaphoretic. HENT:      Head: Normocephalic and atraumatic. Nose: Congestion present.    Eyes: Pupils: Pupils are equal, round, and reactive to light. Cardiovascular:      Rate and Rhythm: Normal rate and regular rhythm. Heart sounds: Normal heart sounds. Pulmonary:      Effort: Pulmonary effort is normal. No tachypnea, bradypnea, accessory muscle usage, prolonged expiration, respiratory distress or retractions. Breath sounds: Normal air entry. No stridor or decreased air movement. Examination of the left-upper field reveals decreased breath sounds. Examination of the left-middle field reveals decreased breath sounds. Decreased breath sounds present. No wheezing, rhonchi or rales. Chest:      Chest wall: Tenderness present. No mass, deformity, swelling, crepitus or edema. Abdominal:      General: There is no distension. Palpations: Abdomen is soft. Musculoskeletal:         General: Normal range of motion. Cervical back: Normal range of motion and neck supple. Skin:     General: Skin is warm and dry. Capillary Refill: Capillary refill takes less than 2 seconds. Neurological:      General: No focal deficit present. Mental Status: She is alert and oriented to person, place, and time. Psychiatric:         Mood and Affect: Mood normal.         Behavior: Behavior normal.         Thought Content:  Thought content normal.         Judgment: Judgment normal.

## 2023-08-01 NOTE — PATIENT INSTRUCTIONS
Stop the amoxicillin. Take the azithromycin (antibiotic) and prednisone (steroid) as ordered until completed. Use the nebulizer OR rescue inhaler every 4 hours as needed--nebulizer is preferred when available. Pneumonia   AMBULATORY CARE:   Pneumonia  is an infection in your lungs caused by bacteria, viruses, fungi, or parasites. You can become infected if you come in contact with someone who is sick. You can get pneumonia if you recently had surgery or needed a ventilator to help you breathe. Pneumonia can also be caused by accidentally inhaling saliva or small pieces of food. Pneumonia may cause mild symptoms, or it can be severe and life-threatening. Common symptoms include the following:   Fever or chills    Cough    Shortness of breath or rapid breathing    Chest pain when you cough or breathe deeply    Headache    Vomiting    Fatigue or confusion    Seek care immediately if:   You cough up blood. Your heart beats more than 100 beats in 1 minute. You are very tired, confused, and cannot think clearly. You have chest pain or trouble breathing. Your lips or fingernails turn gray or blue. Call your doctor if:   Your symptoms are the same or get worse 48 hours after you start antibiotics. Your fever is not below 99°F (37.2°C) 48 hours after you start antibiotics. You have a fever higher than 101°F (38.3°C). You cannot eat, or you have loss of appetite, nausea, or are vomiting. You have questions or concerns about your condition or care. Treatment  may include any of the following:  Antibiotics  help treat pneumonia caused by bacteria. Acetaminophen  decreases pain and fever. It is available without a doctor's order. Ask how much to take and how often to take it. Follow directions.  Read the labels of all other medicines you are using to see if they also contain acetaminophen, or ask your doctor or pharmacist. Acetaminophen can cause liver damage if not taken correctly. NSAIDs , such as ibuprofen, help decrease swelling, pain, and fever. This medicine is available with or without a doctor's order. NSAIDs can cause stomach bleeding or kidney problems in certain people. If you take blood thinner medicine, always ask your healthcare provider if NSAIDs are safe for you. Always read the medicine label and follow directions. Airway clearance techniques  are exercises to help remove mucus so you can breathe more easily. Your healthcare provider will show you how to do the exercises. These exercises may be used along with machines or devices to help decrease your symptoms. Respiratory support  is given to help you breathe. You may receive oxygen to increase the level of oxygen in your blood. You may also need a machine to help you breathe. Manage your symptoms:   Rest as needed. Rest often throughout the day. Alternate times of activity with times of rest.    Drink liquids as directed. Ask how much liquid to drink each day and which liquids are best for you. Liquids help thin your mucus, which may make it easier for you to cough it up. Do not smoke. Smoking increases your risk for pneumonia. Smoking also makes it harder for you to get better after you have had pneumonia. Ask your healthcare provider for information if you need help to quit smoking. Avoid secondhand smoke. Limit alcohol. Women should limit alcohol to 1 drink a day. Men should limit alcohol to 2 drinks a day. A drink of alcohol is 12 ounces of beer, 5 ounces of wine, or 1½ ounces of liquor. Use a cool mist humidifier. A humidifier will help increase air moisture in your home. This may make it easier for you to breathe and help decrease your cough. Keep your head elevated. You may be able to breathe better if you keep your head and upper body elevated. Prevent pneumonia:   Wash your hands often. Use soap and water. Wash for at least 20 seconds.  Rinse with warm, running water for several seconds. Then dry your hands with a clean towel or paper towel. Use hand  that contains alcohol if soap and water are not available. Do not touch your eyes, nose, or mouth without washing your hands first.         Cover a sneeze or cough. Use a tissue that covers your mouth and nose. Throw the tissue away in a trash can right away. Use the bend of your arm if a tissue is not available. Wash your hands well with soap and water or use a hand . Do not stand close to anyone who is sneezing or coughing. Stay away from others until you are well. Do not go to work or other activities. Wait until your symptoms are gone or your healthcare provider says it is okay to return. Ask about vaccines you may need. A pneumonia vaccine can help lower your risk for pneumonia. The vaccine may be recommended every 5 years, starting at age 72. Other vaccines help lower the risk for infections that can become serious for a person who has pneumonia. Get a flu vaccine each year as soon as recommended, usually in September or October. Get a COVID-19 vaccine and booster as directed. Your healthcare provider can tell you if you should also get other vaccines, and when to get them. Follow up with your doctor as directed: You will need to return for more tests. Write down your questions so you remember to ask them during your visits. © Copyright [employer] 2022 Information is for End User's use only and may not be sold, redistributed or otherwise used for commercial purposes. The above information is an  only. It is not intended as medical advice for individual conditions or treatments. Talk to your doctor, nurse or pharmacist before following any medical regimen to see if it is safe and effective for you.

## 2024-10-13 ENCOUNTER — APPOINTMENT (OUTPATIENT)
Dept: RADIOLOGY | Facility: CLINIC | Age: 34
End: 2024-10-13
Payer: COMMERCIAL

## 2024-10-13 ENCOUNTER — OFFICE VISIT (OUTPATIENT)
Dept: URGENT CARE | Facility: CLINIC | Age: 34
End: 2024-10-13
Payer: COMMERCIAL

## 2024-10-13 VITALS
HEART RATE: 73 BPM | DIASTOLIC BLOOD PRESSURE: 86 MMHG | TEMPERATURE: 97.8 F | BODY MASS INDEX: 48.92 KG/M2 | SYSTOLIC BLOOD PRESSURE: 124 MMHG | RESPIRATION RATE: 20 BRPM | WEIGHT: 285 LBS | OXYGEN SATURATION: 99 %

## 2024-10-13 DIAGNOSIS — M25.561 ACUTE PAIN OF RIGHT KNEE: ICD-10-CM

## 2024-10-13 DIAGNOSIS — S83.8X1A SPRAIN OF OTHER LIGAMENT OF RIGHT KNEE, INITIAL ENCOUNTER: Primary | ICD-10-CM

## 2024-10-13 PROCEDURE — G0382 LEV 3 HOSP TYPE B ED VISIT: HCPCS | Performed by: PHYSICIAN ASSISTANT

## 2024-10-13 PROCEDURE — 73562 X-RAY EXAM OF KNEE 3: CPT

## 2024-10-13 PROCEDURE — S9083 URGENT CARE CENTER GLOBAL: HCPCS | Performed by: PHYSICIAN ASSISTANT

## 2024-10-13 RX ORDER — IBUPROFEN 600 MG/1
600 TABLET, FILM COATED ORAL EVERY 6 HOURS PRN
Qty: 30 TABLET | Refills: 0 | Status: SHIPPED | OUTPATIENT
Start: 2024-10-13

## 2024-10-13 NOTE — PROGRESS NOTES
[facility]        NAME: Chacha Fagan is a 34 y.o. female  : 1990    MRN: [id number]  DATE: 2024  TIME: 4:24 PM    /86   Pulse 73   Temp 97.8 °F (36.6 °C)   Resp 20   Wt 129 kg (285 lb)   SpO2 99%   BMI 48.92 kg/m²     Assessment and Plan   Sprain of other ligament of right knee, initial encounter [S83.8X1A]  1. Sprain of other ligament of right knee, initial encounter  XR knee 3 vw right non injury    Compression bandages    ibuprofen (MOTRIN) 600 mg tablet    Ambulatory referral to Orthopedic Surgery            Patient Instructions       Follow up with PCP in 3-5 days.  Proceed to  ER if symptoms worsen.    Chief Complaint     Chief Complaint   Patient presents with    Knee Pain     Right knee pain. No trauma to area. States she feels like her knee cap is falling out of place. More she walks the more pain she has. Unable to fully extend her leg. Started 1 week ago. Has been taking Aleve. Alternating heat and ice.          History of Present Illness       Pt with right knee pan x 1 week    Knee Pain         Review of Systems   Review of Systems   Constitutional: Negative.    HENT: Negative.     Eyes: Negative.    Respiratory: Negative.     Cardiovascular: Negative.    Gastrointestinal: Negative.    Endocrine: Negative.    Genitourinary: Negative.    Musculoskeletal: Negative.    Allergic/Immunologic: Negative.    Neurological: Negative.    Hematological: Negative.    Psychiatric/Behavioral: Negative.     All other systems reviewed and are negative.        Current Medications       Current Outpatient Medications:     Acetaminophen (TYLENOL PO), Take by mouth, Disp: , Rfl:     albuterol (2.5 mg/3 mL) 0.083 % nebulizer solution, Take 3 mL (2.5 mg total) by nebulization every 4 (four) hours as needed for wheezing or shortness of breath, Disp: 90 mL, Rfl: 1    albuterol (ProAir HFA) 90 mcg/act inhaler, Inhale 2 puffs every 4 (four) hours as needed for wheezing or shortness of  breath, Disp: 8.5 g, Rfl: 1    Ascorbic Acid (VITAMIN C PO), Take by mouth, Disp: , Rfl:     ELDERBERRY PO, Take by mouth, Disp: , Rfl:     ibuprofen (MOTRIN) 200 mg tablet, Take 200 mg by mouth every 6 (six) hours as needed, Disp: , Rfl:     ibuprofen (MOTRIN) 600 mg tablet, Take 1 tablet (600 mg total) by mouth every 6 (six) hours as needed for mild pain, Disp: 30 tablet, Rfl: 0    Multiple Vitamins-Minerals (multivitamin with minerals) tablet, Take 1 tablet by mouth daily, Disp: , Rfl:     Current Allergies     Allergies as of 10/13/2024    (No Known Allergies)            The following portions of the patient's history were reviewed and updated as appropriate: allergies, current medications, past family history, past medical history, past social history, past surgical history and problem list.     Past Medical History:   Diagnosis Date    Anxiety     Seasonal allergies        Past Surgical History:   Procedure Laterality Date    ANKLE FRACTURE SURGERY Left      SECTION      KNEE ARTHROSCOPY Right     TONSILLECTOMY      TUBAL LIGATION         History reviewed. No pertinent family history.      Medications have been verified.        Objective   /86   Pulse 73   Temp 97.8 °F (36.6 °C)   Resp 20   Wt 129 kg (285 lb)   SpO2 99%   BMI 48.92 kg/m²        Physical Exam     Physical Exam  Vitals and nursing note reviewed.   Constitutional:       Appearance: Normal appearance. She is normal weight.      Comments: No known injury   Pt with known issue or irregular shaped patella    Cardiovascular:      Rate and Rhythm: Normal rate and regular rhythm.      Pulses: Normal pulses.   Pulmonary:      Effort: Pulmonary effort is normal.      Breath sounds: Normal breath sounds.   Abdominal:      Palpations: Abdomen is soft.   Musculoskeletal:      Cervical back: Normal range of motion and neck supple.      Comments: Right knee from + patella tenderness no anterior /post drawer no valgus no varus no popiteal  pain  no calf pain    Neurological:      Mental Status: She is alert and oriented to person, place, and time.